# Patient Record
Sex: FEMALE | Race: WHITE | NOT HISPANIC OR LATINO | Employment: PART TIME
[De-identification: names, ages, dates, MRNs, and addresses within clinical notes are randomized per-mention and may not be internally consistent; named-entity substitution may affect disease eponyms.]

---

## 2017-02-07 LAB
HPV INTERPRETATION - HISTORICAL: ABNORMAL
HPV INTERPRETER - HISTORICAL: ABNORMAL

## 2017-02-08 ENCOUNTER — RECORDS - HEALTHEAST (OUTPATIENT)
Dept: ADMINISTRATIVE | Facility: OTHER | Age: 57
End: 2017-02-08

## 2017-02-08 LAB
BKR LAB AP ABNORMAL BLEEDING: NO
BKR LAB AP BIRTH CONTROL/HORMONES: ABNORMAL
BKR LAB AP CERVICAL APPEARANCE: NORMAL
BKR LAB AP GYN ADEQUACY: ABNORMAL
BKR LAB AP GYN INTERPRETATION: ABNORMAL
BKR LAB AP HPV REFLEX: ABNORMAL
BKR LAB AP LMP: ABNORMAL
BKR LAB AP PATIENT STATUS: ABNORMAL
BKR LAB AP PREVIOUS ABNORMAL: ABNORMAL
BKR LAB AP PREVIOUS NORMAL: ABNORMAL
HIGH RISK?: NO
LAB AP GYN INTERP 2: ABNORMAL
PATH REPORT.COMMENTS IMP SPEC: ABNORMAL
RESULT FLAG (HE HISTORICAL CONVERSION): ABNORMAL

## 2017-10-17 ASSESSMENT — MIFFLIN-ST. JEOR: SCORE: 1552.88

## 2017-10-18 ENCOUNTER — SURGERY - HEALTHEAST (OUTPATIENT)
Dept: GASTROENTEROLOGY | Facility: HOSPITAL | Age: 57
End: 2017-10-18

## 2017-10-19 ASSESSMENT — MIFFLIN-ST. JEOR: SCORE: 1577.83

## 2018-01-08 ENCOUNTER — RECORDS - HEALTHEAST (OUTPATIENT)
Dept: ADMINISTRATIVE | Facility: OTHER | Age: 58
End: 2018-01-08

## 2018-01-29 ENCOUNTER — HOSPITAL ENCOUNTER (OUTPATIENT)
Dept: RADIOLOGY | Facility: HOSPITAL | Age: 58
Discharge: HOME | End: 2018-01-29
Attending: COLON & RECTAL SURGERY

## 2018-01-29 DIAGNOSIS — K57.92 DIVERTICULITIS: ICD-10-CM

## 2018-06-18 ENCOUNTER — RECORDS - HEALTHEAST (OUTPATIENT)
Dept: LAB | Facility: CLINIC | Age: 58
End: 2018-06-18

## 2018-06-18 LAB
ALBUMIN SERPL-MCNC: 4.4 G/DL (ref 3.5–5)
ALP SERPL-CCNC: 155 U/L (ref 45–120)
ALT SERPL W P-5'-P-CCNC: 23 U/L (ref 0–45)
ANION GAP SERPL CALCULATED.3IONS-SCNC: 11 MMOL/L (ref 5–18)
AST SERPL W P-5'-P-CCNC: 19 U/L (ref 0–40)
BILIRUB SERPL-MCNC: 0.7 MG/DL (ref 0–1)
BUN SERPL-MCNC: 11 MG/DL (ref 8–22)
CALCIUM SERPL-MCNC: 11.1 MG/DL (ref 8.5–10.5)
CHLORIDE BLD-SCNC: 108 MMOL/L (ref 98–107)
CO2 SERPL-SCNC: 23 MMOL/L (ref 22–31)
CREAT SERPL-MCNC: 1.08 MG/DL (ref 0.6–1.1)
GFR SERPL CREATININE-BSD FRML MDRD: 52 ML/MIN/1.73M2
GLUCOSE BLD-MCNC: 139 MG/DL (ref 70–125)
POTASSIUM BLD-SCNC: 4.1 MMOL/L (ref 3.5–5)
PROT SERPL-MCNC: 8.2 G/DL (ref 6–8)
SODIUM SERPL-SCNC: 142 MMOL/L (ref 136–145)

## 2018-07-20 ENCOUNTER — RECORDS - HEALTHEAST (OUTPATIENT)
Dept: LAB | Facility: CLINIC | Age: 58
End: 2018-07-20

## 2018-07-20 LAB
ALBUMIN SERPL-MCNC: 3.8 G/DL (ref 3.5–5)
ALP SERPL-CCNC: 159 U/L (ref 45–120)
ALT SERPL W P-5'-P-CCNC: 18 U/L (ref 0–45)
AMYLASE SERPL-CCNC: 19 U/L (ref 5–120)
ANION GAP SERPL CALCULATED.3IONS-SCNC: 9 MMOL/L (ref 5–18)
AST SERPL W P-5'-P-CCNC: 15 U/L (ref 0–40)
BILIRUB SERPL-MCNC: 0.4 MG/DL (ref 0–1)
BUN SERPL-MCNC: 9 MG/DL (ref 8–22)
CALCIUM SERPL-MCNC: 10.7 MG/DL (ref 8.5–10.5)
CHLORIDE BLD-SCNC: 109 MMOL/L (ref 98–107)
CO2 SERPL-SCNC: 25 MMOL/L (ref 22–31)
CREAT SERPL-MCNC: 0.83 MG/DL (ref 0.6–1.1)
ERYTHROCYTE [SEDIMENTATION RATE] IN BLOOD BY WESTERGREN METHOD: 35 MM/HR (ref 0–20)
GFR SERPL CREATININE-BSD FRML MDRD: >60 ML/MIN/1.73M2
GLUCOSE BLD-MCNC: 114 MG/DL (ref 70–125)
LIPASE SERPL-CCNC: 10 U/L (ref 0–52)
POTASSIUM BLD-SCNC: 4.6 MMOL/L (ref 3.5–5)
PROT SERPL-MCNC: 6.9 G/DL (ref 6–8)
SODIUM SERPL-SCNC: 143 MMOL/L (ref 136–145)

## 2018-09-17 ASSESSMENT — MIFFLIN-ST. JEOR
SCORE: 1510.7
SCORE: 1512.51

## 2018-09-18 ENCOUNTER — SURGERY - HEALTHEAST (OUTPATIENT)
Dept: GASTROENTEROLOGY | Facility: HOSPITAL | Age: 58
End: 2018-09-18

## 2018-09-18 ASSESSMENT — MIFFLIN-ST. JEOR: SCORE: 1467.6

## 2018-09-19 ENCOUNTER — ANESTHESIA - HEALTHEAST (OUTPATIENT)
Dept: GASTROENTEROLOGY | Facility: HOSPITAL | Age: 58
End: 2018-09-19

## 2018-09-19 ASSESSMENT — MIFFLIN-ST. JEOR: SCORE: 1471.23

## 2018-09-20 ASSESSMENT — MIFFLIN-ST. JEOR: SCORE: 1472.59

## 2018-09-21 ENCOUNTER — AMBULATORY - HEALTHEAST (OUTPATIENT)
Dept: CARE COORDINATION | Facility: CLINIC | Age: 58
End: 2018-09-21

## 2018-09-28 ENCOUNTER — COMMUNICATION - HEALTHEAST (OUTPATIENT)
Dept: PHARMACY | Facility: CLINIC | Age: 58
End: 2018-09-28

## 2018-09-28 DIAGNOSIS — K62.5 RECTAL BLEEDING: ICD-10-CM

## 2018-10-16 ENCOUNTER — RECORDS - HEALTHEAST (OUTPATIENT)
Dept: LAB | Facility: CLINIC | Age: 58
End: 2018-10-16

## 2018-10-16 LAB
ERYTHROCYTE [DISTWIDTH] IN BLOOD BY AUTOMATED COUNT: 17.2 % (ref 11–14.5)
HCT VFR BLD AUTO: 30.3 % (ref 35–47)
HGB BLD-MCNC: 9.2 G/DL (ref 12–16)
MCH RBC QN AUTO: 27.1 PG (ref 27–34)
MCHC RBC AUTO-ENTMCNC: 30.4 G/DL (ref 32–36)
MCV RBC AUTO: 89 FL (ref 80–100)
PLATELET # BLD AUTO: 276 THOU/UL (ref 140–440)
PMV BLD AUTO: 9.7 FL (ref 8.5–12.5)
RBC # BLD AUTO: 3.39 MILL/UL (ref 3.8–5.4)
WBC: 4.7 THOU/UL (ref 4–11)

## 2018-10-26 ENCOUNTER — RECORDS - HEALTHEAST (OUTPATIENT)
Dept: ADMINISTRATIVE | Facility: OTHER | Age: 58
End: 2018-10-26

## 2018-12-16 ENCOUNTER — RECORDS - HEALTHEAST (OUTPATIENT)
Dept: LAB | Facility: CLINIC | Age: 58
End: 2018-12-16

## 2018-12-16 LAB
ALBUMIN SERPL-MCNC: 3.7 G/DL (ref 3.5–5)
ALP SERPL-CCNC: 157 U/L (ref 45–120)
ALT SERPL W P-5'-P-CCNC: 20 U/L (ref 0–45)
ANION GAP SERPL CALCULATED.3IONS-SCNC: 6 MMOL/L (ref 5–18)
AST SERPL W P-5'-P-CCNC: 15 U/L (ref 0–40)
BILIRUB SERPL-MCNC: 0.2 MG/DL (ref 0–1)
BUN SERPL-MCNC: 9 MG/DL (ref 8–22)
CALCIUM SERPL-MCNC: 10.2 MG/DL (ref 8.5–10.5)
CHLORIDE BLD-SCNC: 107 MMOL/L (ref 98–107)
CO2 SERPL-SCNC: 26 MMOL/L (ref 22–31)
CREAT SERPL-MCNC: 0.8 MG/DL (ref 0.6–1.1)
ERYTHROCYTE [DISTWIDTH] IN BLOOD BY AUTOMATED COUNT: 17.2 % (ref 11–14.5)
GFR SERPL CREATININE-BSD FRML MDRD: >60 ML/MIN/1.73M2
GLUCOSE BLD-MCNC: 123 MG/DL (ref 70–125)
HCT VFR BLD AUTO: 31.5 % (ref 35–47)
HGB BLD-MCNC: 9.5 G/DL (ref 12–16)
MCH RBC QN AUTO: 26.5 PG (ref 27–34)
MCHC RBC AUTO-ENTMCNC: 30.2 G/DL (ref 32–36)
MCV RBC AUTO: 88 FL (ref 80–100)
PLATELET # BLD AUTO: 285 THOU/UL (ref 140–440)
PMV BLD AUTO: 9.7 FL (ref 8.5–12.5)
POTASSIUM BLD-SCNC: 4.3 MMOL/L (ref 3.5–5)
PROT SERPL-MCNC: 6.9 G/DL (ref 6–8)
RBC # BLD AUTO: 3.59 MILL/UL (ref 3.8–5.4)
SODIUM SERPL-SCNC: 139 MMOL/L (ref 136–145)
TSH SERPL DL<=0.005 MIU/L-ACNC: 0.9 UIU/ML (ref 0.3–5)
WBC: 3.9 THOU/UL (ref 4–11)

## 2019-01-02 ENCOUNTER — RECORDS - HEALTHEAST (OUTPATIENT)
Dept: ADMINISTRATIVE | Facility: OTHER | Age: 59
End: 2019-01-02

## 2019-01-11 ENCOUNTER — RECORDS - HEALTHEAST (OUTPATIENT)
Dept: LAB | Facility: CLINIC | Age: 59
End: 2019-01-11

## 2019-01-11 LAB
ANION GAP SERPL CALCULATED.3IONS-SCNC: 9 MMOL/L (ref 5–18)
BUN SERPL-MCNC: 10 MG/DL (ref 8–22)
CALCIUM SERPL-MCNC: 10.2 MG/DL (ref 8.5–10.5)
CHLORIDE BLD-SCNC: 104 MMOL/L (ref 98–107)
CO2 SERPL-SCNC: 24 MMOL/L (ref 22–31)
CREAT SERPL-MCNC: 0.78 MG/DL (ref 0.6–1.1)
GFR SERPL CREATININE-BSD FRML MDRD: >60 ML/MIN/1.73M2
GLUCOSE BLD-MCNC: 113 MG/DL (ref 70–125)
POTASSIUM BLD-SCNC: 3.5 MMOL/L (ref 3.5–5)
SODIUM SERPL-SCNC: 137 MMOL/L (ref 136–145)

## 2020-03-04 ENCOUNTER — RECORDS - HEALTHEAST (OUTPATIENT)
Dept: ADMINISTRATIVE | Facility: OTHER | Age: 60
End: 2020-03-04

## 2020-03-05 ENCOUNTER — HOSPITAL ENCOUNTER (OUTPATIENT)
Dept: MAMMOGRAPHY | Facility: CLINIC | Age: 60
Discharge: HOME | End: 2020-03-05
Attending: PHYSICIAN ASSISTANT

## 2020-03-05 DIAGNOSIS — Z12.31 SCREENING MAMMOGRAM, ENCOUNTER FOR: ICD-10-CM

## 2020-04-21 ENCOUNTER — RECORDS - HEALTHEAST (OUTPATIENT)
Dept: LAB | Facility: CLINIC | Age: 60
End: 2020-04-21

## 2020-04-21 LAB
ALBUMIN SERPL-MCNC: 3.7 G/DL (ref 3.5–5)
ALP SERPL-CCNC: 166 U/L (ref 45–120)
ALT SERPL W P-5'-P-CCNC: 31 U/L (ref 0–45)
ANION GAP SERPL CALCULATED.3IONS-SCNC: 8 MMOL/L (ref 5–18)
AST SERPL W P-5'-P-CCNC: 18 U/L (ref 0–40)
BILIRUB SERPL-MCNC: 0.4 MG/DL (ref 0–1)
BUN SERPL-MCNC: 13 MG/DL (ref 8–22)
CALCIUM SERPL-MCNC: 10.6 MG/DL (ref 8.5–10.5)
CHLORIDE BLD-SCNC: 104 MMOL/L (ref 98–107)
CO2 SERPL-SCNC: 27 MMOL/L (ref 22–31)
CREAT SERPL-MCNC: 0.82 MG/DL (ref 0.6–1.1)
GFR SERPL CREATININE-BSD FRML MDRD: >60 ML/MIN/1.73M2
GLUCOSE BLD-MCNC: 101 MG/DL (ref 70–125)
POTASSIUM BLD-SCNC: 4 MMOL/L (ref 3.5–5)
PROT SERPL-MCNC: 7.5 G/DL (ref 6–8)
SODIUM SERPL-SCNC: 139 MMOL/L (ref 136–145)

## 2021-05-07 ENCOUNTER — RECORDS - HEALTHEAST (OUTPATIENT)
Dept: LAB | Facility: CLINIC | Age: 61
End: 2021-05-07

## 2021-05-07 LAB
ALBUMIN SERPL-MCNC: 3.5 G/DL (ref 3.5–5)
ANION GAP SERPL CALCULATED.3IONS-SCNC: 8 MMOL/L (ref 5–18)
BUN SERPL-MCNC: 13 MG/DL (ref 8–22)
CALCIUM SERPL-MCNC: 9.8 MG/DL (ref 8.5–10.5)
CHLORIDE BLD-SCNC: 106 MMOL/L (ref 98–107)
CO2 SERPL-SCNC: 25 MMOL/L (ref 22–31)
CREAT SERPL-MCNC: 0.72 MG/DL (ref 0.6–1.1)
GFR SERPL CREATININE-BSD FRML MDRD: >60 ML/MIN/1.73M2
GLUCOSE BLD-MCNC: 131 MG/DL (ref 70–125)
PHOSPHATE SERPL-MCNC: 2.3 MG/DL (ref 2.5–4.5)
POTASSIUM BLD-SCNC: 4.2 MMOL/L (ref 3.5–5)
SODIUM SERPL-SCNC: 139 MMOL/L (ref 136–145)

## 2021-05-20 ENCOUNTER — RECORDS - HEALTHEAST (OUTPATIENT)
Dept: HEALTH INFORMATION MANAGEMENT | Facility: CLINIC | Age: 61
End: 2021-05-20

## 2021-05-20 ENCOUNTER — TRANSFERRED RECORDS (OUTPATIENT)
Dept: HEALTH INFORMATION MANAGEMENT | Facility: CLINIC | Age: 61
End: 2021-05-20

## 2021-05-31 VITALS — HEIGHT: 66 IN | WEIGHT: 221.8 LBS | BODY MASS INDEX: 35.65 KG/M2

## 2021-06-02 ENCOUNTER — RECORDS - HEALTHEAST (OUTPATIENT)
Dept: ADMINISTRATIVE | Facility: CLINIC | Age: 61
End: 2021-06-02

## 2021-06-02 VITALS — HEIGHT: 66 IN | WEIGHT: 198.6 LBS | BODY MASS INDEX: 31.92 KG/M2

## 2021-06-14 ENCOUNTER — RECORDS - HEALTHEAST (OUTPATIENT)
Dept: ADMINISTRATIVE | Facility: OTHER | Age: 61
End: 2021-06-14

## 2021-06-15 ENCOUNTER — RECORDS - HEALTHEAST (OUTPATIENT)
Dept: ADMINISTRATIVE | Facility: OTHER | Age: 61
End: 2021-06-15

## 2021-06-16 PROBLEM — K58.9 IBS (IRRITABLE BOWEL SYNDROME): Status: ACTIVE | Noted: 2017-10-15

## 2021-06-16 PROBLEM — K92.2 GASTROINTESTINAL BLEEDING: Status: ACTIVE | Noted: 2018-09-17

## 2021-06-16 PROBLEM — R10.13 ABDOMINAL PAIN, EPIGASTRIC: Status: ACTIVE | Noted: 2018-09-18

## 2021-06-16 PROBLEM — Z86.79 HISTORY OF ATRIAL FIBRILLATION: Status: ACTIVE | Noted: 2017-10-11

## 2021-06-16 PROBLEM — K62.5 RECTAL BLEEDING: Status: ACTIVE | Noted: 2018-09-17

## 2021-06-16 PROBLEM — E83.42 HYPOMAGNESEMIA: Status: ACTIVE | Noted: 2017-10-18

## 2021-06-16 PROBLEM — R11.10 VOMITING: Status: ACTIVE | Noted: 2018-12-26

## 2021-06-16 PROBLEM — I10 ACCELERATED HYPERTENSION: Status: ACTIVE | Noted: 2018-10-22

## 2021-06-16 PROBLEM — R11.2 NAUSEA AND VOMITING: Status: ACTIVE | Noted: 2018-12-26

## 2021-06-16 PROBLEM — K31.84 GASTROPARESIS: Status: ACTIVE | Noted: 2017-10-19

## 2021-06-20 NOTE — PROGRESS NOTES
Clinic Care Coordination Referral received from [facility].  Patient does not have a [facility] PCP.  Leslira  notified of [facility] referral made.

## 2021-06-23 ENCOUNTER — TRANSFERRED RECORDS (OUTPATIENT)
Dept: HEALTH INFORMATION MANAGEMENT | Facility: CLINIC | Age: 61
End: 2021-06-23

## 2021-06-23 ENCOUNTER — RECORDS - HEALTHEAST (OUTPATIENT)
Dept: LAB | Facility: CLINIC | Age: 61
End: 2021-06-23

## 2021-06-23 LAB
ANION GAP SERPL CALCULATED.3IONS-SCNC: 10 MMOL/L (ref 5–18)
BUN SERPL-MCNC: 10 MG/DL (ref 8–22)
CALCIUM SERPL-MCNC: 9.9 MG/DL (ref 8.5–10.5)
CHLORIDE BLD-SCNC: 104 MMOL/L (ref 98–107)
CO2 SERPL-SCNC: 23 MMOL/L (ref 22–31)
CREAT SERPL-MCNC: 0.76 MG/DL (ref 0.6–1.1)
GFR SERPL CREATININE-BSD FRML MDRD: >60 ML/MIN/1.73M2
GLUCOSE BLD-MCNC: 129 MG/DL (ref 70–125)
POTASSIUM BLD-SCNC: 4.4 MMOL/L (ref 3.5–5)
SODIUM SERPL-SCNC: 137 MMOL/L (ref 136–145)

## 2021-06-24 LAB
SARS-COV-2 PCR COMMENT: NORMAL
SARS-COV-2 RNA SPEC QL NAA+PROBE: NEGATIVE
SARS-COV-2 VIRUS SPECIMEN SOURCE: NORMAL

## 2021-06-27 DIAGNOSIS — Z11.59 ENCOUNTER FOR SCREENING FOR OTHER VIRAL DISEASES: Primary | ICD-10-CM

## 2021-07-21 ASSESSMENT — MIFFLIN-ST. JEOR: SCORE: 1705.74

## 2021-07-21 NOTE — PROVIDER NOTIFICATION
Discharge plan according to [facility]:       07/21/21 1227   Discharge Planning   Patient/Family Anticipates Transition to home with family   Living Arrangements   People in home child(ashley), adult   Type of Residence Private Residence   Is your private residence a single family home or apartment? Single family home   Bathroom Shower/Tub Tub/Shower unit   Equipment Currently Used at Home none   Support System   Support Systems Children   Do you have someone available to stay with you one or two nights once you are home? Yes

## 2021-07-22 ENCOUNTER — LAB REQUISITION (OUTPATIENT)
Dept: LAB | Facility: CLINIC | Age: 61
End: 2021-07-22
Payer: COMMERCIAL

## 2021-07-22 ENCOUNTER — LAB REQUISITION (OUTPATIENT)
Dept: LAB | Facility: CLINIC | Age: 61
End: 2021-07-22

## 2021-07-22 DIAGNOSIS — Z01.812 ENCOUNTER FOR PREPROCEDURAL LABORATORY EXAMINATION: ICD-10-CM

## 2021-07-22 LAB
ALBUMIN SERPL-MCNC: 3.6 G/DL (ref 3.5–5)
ANION GAP SERPL CALCULATED.3IONS-SCNC: 7 MMOL/L (ref 5–18)
BUN SERPL-MCNC: 15 MG/DL (ref 8–22)
CALCIUM SERPL-MCNC: 10.4 MG/DL (ref 8.5–10.5)
CHLORIDE BLD-SCNC: 106 MMOL/L (ref 98–107)
CO2 SERPL-SCNC: 26 MMOL/L (ref 22–31)
CREAT SERPL-MCNC: 0.8 MG/DL (ref 0.6–1.1)
GFR SERPL CREATININE-BSD FRML MDRD: 79 ML/MIN/1.73M2
GLUCOSE BLD-MCNC: 106 MG/DL (ref 70–125)
PHOSPHATE SERPL-MCNC: 2.8 MG/DL (ref 2.5–4.5)
POTASSIUM BLD-SCNC: 4.1 MMOL/L (ref 3.5–5)
SODIUM SERPL-SCNC: 139 MMOL/L (ref 136–145)

## 2021-07-22 PROCEDURE — U0005 INFEC AGEN DETEC AMPLI PROBE: HCPCS | Mod: ORL | Performed by: FAMILY MEDICINE

## 2021-07-22 PROCEDURE — 80069 RENAL FUNCTION PANEL: CPT | Performed by: FAMILY MEDICINE

## 2021-07-23 LAB — SARS-COV-2 RNA RESP QL NAA+PROBE: NEGATIVE

## 2021-07-26 ENCOUNTER — ANESTHESIA (OUTPATIENT)
Dept: SURGERY | Facility: CLINIC | Age: 61
End: 2021-07-26
Payer: COMMERCIAL

## 2021-07-26 ENCOUNTER — HOSPITAL ENCOUNTER (OUTPATIENT)
Facility: CLINIC | Age: 61
Discharge: HOME | End: 2021-07-27
Attending: ORTHOPAEDIC SURGERY | Admitting: ORTHOPAEDIC SURGERY
Payer: COMMERCIAL

## 2021-07-26 ENCOUNTER — APPOINTMENT (OUTPATIENT)
Dept: RADIOLOGY | Facility: CLINIC | Age: 61
End: 2021-07-26
Attending: PHYSICIAN ASSISTANT
Payer: COMMERCIAL

## 2021-07-26 ENCOUNTER — ANESTHESIA EVENT (OUTPATIENT)
Dept: SURGERY | Facility: CLINIC | Age: 61
End: 2021-07-26
Payer: COMMERCIAL

## 2021-07-26 ENCOUNTER — ANCILLARY PROCEDURE (OUTPATIENT)
Dept: ULTRASOUND IMAGING | Facility: CLINIC | Age: 61
End: 2021-07-26
Attending: ANESTHESIOLOGY
Payer: COMMERCIAL

## 2021-07-26 DIAGNOSIS — Z96.651 STATUS POST TOTAL RIGHT KNEE REPLACEMENT: ICD-10-CM

## 2021-07-26 DIAGNOSIS — M17.11 PRIMARY OSTEOARTHRITIS OF RIGHT KNEE: Primary | ICD-10-CM

## 2021-07-26 LAB
ERYTHROCYTE [DISTWIDTH] IN BLOOD BY AUTOMATED COUNT: 12.1 % (ref 10–15)
HCT VFR BLD AUTO: 39.5 % (ref 35–47)
HGB BLD-MCNC: 13.6 G/DL (ref 11.7–15.7)
MCH RBC QN AUTO: 31.9 PG (ref 26.5–33)
MCHC RBC AUTO-ENTMCNC: 34.4 G/DL (ref 31.5–36.5)
MCV RBC AUTO: 93 FL (ref 78–100)
PLATELET # BLD AUTO: 228 10E3/UL (ref 150–450)
RBC # BLD AUTO: 4.26 10E6/UL (ref 3.8–5.2)
WBC # BLD AUTO: 5 10E3/UL (ref 4–11)

## 2021-07-26 PROCEDURE — 250N000011 HC RX IP 250 OP 636: Performed by: ANESTHESIOLOGY

## 2021-07-26 PROCEDURE — 258N000003 HC RX IP 258 OP 636: Performed by: ANESTHESIOLOGY

## 2021-07-26 PROCEDURE — 250N000013 HC RX MED GY IP 250 OP 250 PS 637: Performed by: FAMILY MEDICINE

## 2021-07-26 PROCEDURE — 36415 COLL VENOUS BLD VENIPUNCTURE: CPT | Performed by: PHYSICIAN ASSISTANT

## 2021-07-26 PROCEDURE — 250N000011 HC RX IP 250 OP 636: Performed by: PHYSICIAN ASSISTANT

## 2021-07-26 PROCEDURE — 999N000141 HC STATISTIC PRE-PROCEDURE NURSING ASSESSMENT: Performed by: ORTHOPAEDIC SURGERY

## 2021-07-26 PROCEDURE — 250N000009 HC RX 250: Performed by: ANESTHESIOLOGY

## 2021-07-26 PROCEDURE — 999N000065 XR KNEE PORT RIGHT 1/2 VIEWS: Mod: RT

## 2021-07-26 PROCEDURE — C1776 JOINT DEVICE (IMPLANTABLE): HCPCS | Performed by: ORTHOPAEDIC SURGERY

## 2021-07-26 PROCEDURE — 250N000009 HC RX 250: Performed by: PHYSICIAN ASSISTANT

## 2021-07-26 PROCEDURE — 250N000011 HC RX IP 250 OP 636: Performed by: NURSE ANESTHETIST, CERTIFIED REGISTERED

## 2021-07-26 PROCEDURE — 278N000051 HC OR IMPLANT GENERAL: Performed by: ORTHOPAEDIC SURGERY

## 2021-07-26 PROCEDURE — 258N000003 HC RX IP 258 OP 636: Performed by: NURSE ANESTHETIST, CERTIFIED REGISTERED

## 2021-07-26 PROCEDURE — 250N000013 HC RX MED GY IP 250 OP 250 PS 637: Performed by: PHYSICIAN ASSISTANT

## 2021-07-26 PROCEDURE — 250N000009 HC RX 250: Performed by: NURSE ANESTHETIST, CERTIFIED REGISTERED

## 2021-07-26 PROCEDURE — 250N000013 HC RX MED GY IP 250 OP 250 PS 637: Performed by: ANESTHESIOLOGY

## 2021-07-26 PROCEDURE — 370N000017 HC ANESTHESIA TECHNICAL FEE, PER MIN: Performed by: ORTHOPAEDIC SURGERY

## 2021-07-26 PROCEDURE — 710N000009 HC RECOVERY PHASE 1, LEVEL 1, PER MIN: Performed by: ORTHOPAEDIC SURGERY

## 2021-07-26 PROCEDURE — 258N000003 HC RX IP 258 OP 636: Performed by: PHYSICIAN ASSISTANT

## 2021-07-26 PROCEDURE — 99204 OFFICE O/P NEW MOD 45 MIN: CPT | Performed by: FAMILY MEDICINE

## 2021-07-26 PROCEDURE — 360N000077 HC SURGERY LEVEL 4, PER MIN: Performed by: ORTHOPAEDIC SURGERY

## 2021-07-26 PROCEDURE — 85027 COMPLETE CBC AUTOMATED: CPT | Performed by: PHYSICIAN ASSISTANT

## 2021-07-26 PROCEDURE — 272N000001 HC OR GENERAL SUPPLY STERILE: Performed by: ORTHOPAEDIC SURGERY

## 2021-07-26 PROCEDURE — 258N000001 HC RX 258: Performed by: ORTHOPAEDIC SURGERY

## 2021-07-26 DEVICE — TIBIAL BEARING INSERT - CS
Type: IMPLANTABLE DEVICE | Site: KNEE | Status: FUNCTIONAL
Brand: TRIATHLON

## 2021-07-26 DEVICE — PRIMARY TIBIAL BASEPLATE
Type: IMPLANTABLE DEVICE | Site: KNEE | Status: FUNCTIONAL
Brand: TRIATHLON

## 2021-07-26 DEVICE — BONE CEMENT SIMPLEX FULL DOSE 6191-1-001: Type: IMPLANTABLE DEVICE | Site: KNEE | Status: FUNCTIONAL

## 2021-07-26 DEVICE — CRUCIATE RETAINING FEMORAL
Type: IMPLANTABLE DEVICE | Site: KNEE | Status: FUNCTIONAL
Brand: TRIATHLON

## 2021-07-26 RX ORDER — LIDOCAINE 40 MG/G
CREAM TOPICAL
Status: DISCONTINUED | OUTPATIENT
Start: 2021-07-26 | End: 2021-07-27 | Stop reason: HOSPADM

## 2021-07-26 RX ORDER — ONDANSETRON 4 MG/1
4 TABLET, ORALLY DISINTEGRATING ORAL EVERY 6 HOURS PRN
Status: DISCONTINUED | OUTPATIENT
Start: 2021-07-26 | End: 2021-07-27 | Stop reason: HOSPADM

## 2021-07-26 RX ORDER — CALCIUM CARBONATE 500 MG/1
500 TABLET, CHEWABLE ORAL 4 TIMES DAILY PRN
Status: DISCONTINUED | OUTPATIENT
Start: 2021-07-26 | End: 2021-07-27 | Stop reason: HOSPADM

## 2021-07-26 RX ORDER — DIPHENHYDRAMINE HCL 25 MG
25 TABLET ORAL EVERY 6 HOURS PRN
Status: DISCONTINUED | OUTPATIENT
Start: 2021-07-26 | End: 2021-07-26 | Stop reason: HOSPADM

## 2021-07-26 RX ORDER — ACETAMINOPHEN 325 MG/1
975 TABLET ORAL ONCE
Status: COMPLETED | OUTPATIENT
Start: 2021-07-26 | End: 2021-07-26

## 2021-07-26 RX ORDER — SODIUM CHLORIDE, SODIUM LACTATE, POTASSIUM CHLORIDE, CALCIUM CHLORIDE 600; 310; 30; 20 MG/100ML; MG/100ML; MG/100ML; MG/100ML
INJECTION, SOLUTION INTRAVENOUS CONTINUOUS
Status: DISCONTINUED | OUTPATIENT
Start: 2021-07-26 | End: 2021-07-27 | Stop reason: HOSPADM

## 2021-07-26 RX ORDER — ACETAMINOPHEN 325 MG/1
975 TABLET ORAL EVERY 8 HOURS
Status: DISCONTINUED | OUTPATIENT
Start: 2021-07-26 | End: 2021-07-27 | Stop reason: HOSPADM

## 2021-07-26 RX ORDER — IBUPROFEN 800 MG/1
800 TABLET, FILM COATED ORAL EVERY 8 HOURS PRN
Status: ON HOLD | COMMUNITY
End: 2021-07-27

## 2021-07-26 RX ORDER — HALOPERIDOL 5 MG/ML
1 INJECTION INTRAMUSCULAR
Status: DISCONTINUED | OUTPATIENT
Start: 2021-07-26 | End: 2021-07-26 | Stop reason: HOSPADM

## 2021-07-26 RX ORDER — TRANEXAMIC ACID 650 MG/1
1950 TABLET ORAL ONCE
Status: COMPLETED | OUTPATIENT
Start: 2021-07-26 | End: 2021-07-26

## 2021-07-26 RX ORDER — CEFADROXIL 500 MG/1
500 CAPSULE ORAL 2 TIMES DAILY
Qty: 14 CAPSULE | Refills: 0 | Status: SHIPPED | OUTPATIENT
Start: 2021-07-26 | End: 2021-07-26

## 2021-07-26 RX ORDER — ASPIRIN 81 MG/1
81 TABLET, CHEWABLE ORAL 2 TIMES DAILY
Qty: 60 TABLET | Refills: 0 | Status: SHIPPED | OUTPATIENT
Start: 2021-07-26

## 2021-07-26 RX ORDER — OXYCODONE HYDROCHLORIDE 5 MG/1
5-10 TABLET ORAL EVERY 6 HOURS PRN
Qty: 50 TABLET | Refills: 0 | Status: SHIPPED | OUTPATIENT
Start: 2021-07-26 | End: 2021-07-26

## 2021-07-26 RX ORDER — OXYCODONE HYDROCHLORIDE 5 MG/1
5 TABLET ORAL EVERY 4 HOURS PRN
Status: DISCONTINUED | OUTPATIENT
Start: 2021-07-26 | End: 2021-07-27 | Stop reason: HOSPADM

## 2021-07-26 RX ORDER — SODIUM CHLORIDE, SODIUM LACTATE, POTASSIUM CHLORIDE, CALCIUM CHLORIDE 600; 310; 30; 20 MG/100ML; MG/100ML; MG/100ML; MG/100ML
INJECTION, SOLUTION INTRAVENOUS CONTINUOUS
Status: DISCONTINUED | OUTPATIENT
Start: 2021-07-26 | End: 2021-07-26 | Stop reason: HOSPADM

## 2021-07-26 RX ORDER — BUPIVACAINE HYDROCHLORIDE 7.5 MG/ML
INJECTION, SOLUTION INTRASPINAL
Status: COMPLETED | OUTPATIENT
Start: 2021-07-26 | End: 2021-07-26

## 2021-07-26 RX ORDER — HYDROXYZINE PAMOATE 25 MG/1
25 CAPSULE ORAL 4 TIMES DAILY
Qty: 60 CAPSULE | Refills: 0 | Status: SHIPPED | OUTPATIENT
Start: 2021-07-26

## 2021-07-26 RX ORDER — HYDROXYZINE PAMOATE 25 MG/1
25 CAPSULE ORAL 4 TIMES DAILY
Qty: 60 CAPSULE | Refills: 0 | Status: SHIPPED | OUTPATIENT
Start: 2021-07-26 | End: 2021-07-26

## 2021-07-26 RX ORDER — FERROUS SULFATE 325(65) MG
325 TABLET ORAL 2 TIMES DAILY WITH MEALS
Status: DISCONTINUED | OUTPATIENT
Start: 2021-07-26 | End: 2021-07-27 | Stop reason: HOSPADM

## 2021-07-26 RX ORDER — SENNA AND DOCUSATE SODIUM 50; 8.6 MG/1; MG/1
2 TABLET, FILM COATED ORAL 2 TIMES DAILY
Qty: 50 TABLET | Refills: 0 | Status: SHIPPED | OUTPATIENT
Start: 2021-07-26 | End: 2021-07-26

## 2021-07-26 RX ORDER — CEFAZOLIN SODIUM 2 G/100ML
2 INJECTION, SOLUTION INTRAVENOUS
Status: COMPLETED | OUTPATIENT
Start: 2021-07-26 | End: 2021-07-26

## 2021-07-26 RX ORDER — GLYCOPYRROLATE 0.2 MG/ML
INJECTION, SOLUTION INTRAMUSCULAR; INTRAVENOUS PRN
Status: DISCONTINUED | OUTPATIENT
Start: 2021-07-26 | End: 2021-07-26

## 2021-07-26 RX ORDER — OXYCODONE HYDROCHLORIDE 5 MG/1
5-10 TABLET ORAL EVERY 6 HOURS PRN
Qty: 50 TABLET | Refills: 0 | Status: SHIPPED | OUTPATIENT
Start: 2021-07-26

## 2021-07-26 RX ORDER — PROCHLORPERAZINE MALEATE 10 MG
10 TABLET ORAL EVERY 6 HOURS PRN
Status: DISCONTINUED | OUTPATIENT
Start: 2021-07-26 | End: 2021-07-27 | Stop reason: HOSPADM

## 2021-07-26 RX ORDER — HYDROMORPHONE HCL IN WATER/PF 6 MG/30 ML
0.4 PATIENT CONTROLLED ANALGESIA SYRINGE INTRAVENOUS
Status: DISCONTINUED | OUTPATIENT
Start: 2021-07-26 | End: 2021-07-27 | Stop reason: HOSPADM

## 2021-07-26 RX ORDER — PROPOFOL 10 MG/ML
INJECTION, EMULSION INTRAVENOUS PRN
Status: DISCONTINUED | OUTPATIENT
Start: 2021-07-26 | End: 2021-07-26

## 2021-07-26 RX ORDER — CEFADROXIL 500 MG/1
500 CAPSULE ORAL 2 TIMES DAILY
Qty: 14 CAPSULE | Refills: 0 | Status: SHIPPED | OUTPATIENT
Start: 2021-07-26

## 2021-07-26 RX ORDER — AMOXICILLIN 250 MG
1 CAPSULE ORAL 2 TIMES DAILY
Status: DISCONTINUED | OUTPATIENT
Start: 2021-07-26 | End: 2021-07-27 | Stop reason: HOSPADM

## 2021-07-26 RX ORDER — ONDANSETRON 4 MG/1
4 TABLET, ORALLY DISINTEGRATING ORAL EVERY 30 MIN PRN
Status: DISCONTINUED | OUTPATIENT
Start: 2021-07-26 | End: 2021-07-26 | Stop reason: HOSPADM

## 2021-07-26 RX ORDER — CELECOXIB 200 MG/1
400 CAPSULE ORAL ONCE
Status: COMPLETED | OUTPATIENT
Start: 2021-07-26 | End: 2021-07-26

## 2021-07-26 RX ORDER — ONDANSETRON 2 MG/ML
4 INJECTION INTRAMUSCULAR; INTRAVENOUS EVERY 6 HOURS PRN
Status: DISCONTINUED | OUTPATIENT
Start: 2021-07-26 | End: 2021-07-27 | Stop reason: HOSPADM

## 2021-07-26 RX ORDER — FENTANYL CITRATE 50 UG/ML
50 INJECTION, SOLUTION INTRAMUSCULAR; INTRAVENOUS
Status: DISCONTINUED | OUTPATIENT
Start: 2021-07-26 | End: 2021-07-26

## 2021-07-26 RX ORDER — ONDANSETRON 2 MG/ML
4 INJECTION INTRAMUSCULAR; INTRAVENOUS EVERY 30 MIN PRN
Status: DISCONTINUED | OUTPATIENT
Start: 2021-07-26 | End: 2021-07-26 | Stop reason: HOSPADM

## 2021-07-26 RX ORDER — ACETAMINOPHEN 325 MG/1
650 TABLET ORAL EVERY 4 HOURS PRN
Status: DISCONTINUED | OUTPATIENT
Start: 2021-07-29 | End: 2021-07-27 | Stop reason: HOSPADM

## 2021-07-26 RX ORDER — DIPHENHYDRAMINE HYDROCHLORIDE 50 MG/ML
25 INJECTION INTRAMUSCULAR; INTRAVENOUS EVERY 6 HOURS PRN
Status: DISCONTINUED | OUTPATIENT
Start: 2021-07-26 | End: 2021-07-26 | Stop reason: HOSPADM

## 2021-07-26 RX ORDER — ASPIRIN 81 MG/1
81 TABLET, CHEWABLE ORAL 2 TIMES DAILY
Qty: 60 TABLET | Refills: 0 | Status: SHIPPED | OUTPATIENT
Start: 2021-07-26 | End: 2021-07-26

## 2021-07-26 RX ORDER — FENTANYL CITRATE 50 UG/ML
50 INJECTION, SOLUTION INTRAMUSCULAR; INTRAVENOUS EVERY 5 MIN PRN
Status: ACTIVE | OUTPATIENT
Start: 2021-07-26 | End: 2021-07-26

## 2021-07-26 RX ORDER — HYDROMORPHONE HCL IN WATER/PF 6 MG/30 ML
0.2 PATIENT CONTROLLED ANALGESIA SYRINGE INTRAVENOUS EVERY 5 MIN PRN
Status: ACTIVE | OUTPATIENT
Start: 2021-07-26 | End: 2021-07-26

## 2021-07-26 RX ORDER — ONDANSETRON 2 MG/ML
INJECTION INTRAMUSCULAR; INTRAVENOUS PRN
Status: DISCONTINUED | OUTPATIENT
Start: 2021-07-26 | End: 2021-07-26

## 2021-07-26 RX ORDER — POLYETHYLENE GLYCOL 3350 17 G/17G
17 POWDER, FOR SOLUTION ORAL DAILY
Status: DISCONTINUED | OUTPATIENT
Start: 2021-07-27 | End: 2021-07-27 | Stop reason: HOSPADM

## 2021-07-26 RX ORDER — EPHEDRINE SULFATE 50 MG/ML
INJECTION, SOLUTION INTRAMUSCULAR; INTRAVENOUS; SUBCUTANEOUS PRN
Status: DISCONTINUED | OUTPATIENT
Start: 2021-07-26 | End: 2021-07-26

## 2021-07-26 RX ORDER — ATENOLOL 25 MG/1
25 TABLET ORAL 2 TIMES DAILY
Status: DISCONTINUED | OUTPATIENT
Start: 2021-07-26 | End: 2021-07-27 | Stop reason: HOSPADM

## 2021-07-26 RX ORDER — SENNA AND DOCUSATE SODIUM 50; 8.6 MG/1; MG/1
2 TABLET, FILM COATED ORAL 2 TIMES DAILY
Qty: 50 TABLET | Refills: 0 | Status: SHIPPED | OUTPATIENT
Start: 2021-07-26

## 2021-07-26 RX ORDER — MAGNESIUM SULFATE 4 G/50ML
4 INJECTION INTRAVENOUS ONCE
Status: COMPLETED | OUTPATIENT
Start: 2021-07-26 | End: 2021-07-26

## 2021-07-26 RX ORDER — ALBUTEROL SULFATE 0.83 MG/ML
2.5 SOLUTION RESPIRATORY (INHALATION) EVERY 4 HOURS PRN
Status: DISCONTINUED | OUTPATIENT
Start: 2021-07-26 | End: 2021-07-26 | Stop reason: HOSPADM

## 2021-07-26 RX ORDER — PROPOFOL 10 MG/ML
INJECTION, EMULSION INTRAVENOUS CONTINUOUS PRN
Status: DISCONTINUED | OUTPATIENT
Start: 2021-07-26 | End: 2021-07-26

## 2021-07-26 RX ORDER — BISACODYL 10 MG
10 SUPPOSITORY, RECTAL RECTAL DAILY PRN
Status: DISCONTINUED | OUTPATIENT
Start: 2021-07-26 | End: 2021-07-27 | Stop reason: HOSPADM

## 2021-07-26 RX ORDER — HYDROXYZINE HYDROCHLORIDE 25 MG/1
25 TABLET, FILM COATED ORAL EVERY 6 HOURS PRN
Status: DISCONTINUED | OUTPATIENT
Start: 2021-07-26 | End: 2021-07-27 | Stop reason: HOSPADM

## 2021-07-26 RX ORDER — OXYCODONE HCL 10 MG/1
10 TABLET, FILM COATED, EXTENDED RELEASE ORAL ONCE
Status: COMPLETED | OUTPATIENT
Start: 2021-07-26 | End: 2021-07-26

## 2021-07-26 RX ORDER — KETOROLAC TROMETHAMINE 30 MG/ML
15 INJECTION, SOLUTION INTRAMUSCULAR; INTRAVENOUS EVERY 6 HOURS
Status: DISCONTINUED | OUTPATIENT
Start: 2021-07-26 | End: 2021-07-27 | Stop reason: HOSPADM

## 2021-07-26 RX ORDER — HYDROMORPHONE HCL IN WATER/PF 6 MG/30 ML
0.2 PATIENT CONTROLLED ANALGESIA SYRINGE INTRAVENOUS
Status: DISCONTINUED | OUTPATIENT
Start: 2021-07-26 | End: 2021-07-27 | Stop reason: HOSPADM

## 2021-07-26 RX ORDER — CEFAZOLIN SODIUM 2 G/100ML
2 INJECTION, SOLUTION INTRAVENOUS EVERY 8 HOURS
Status: COMPLETED | OUTPATIENT
Start: 2021-07-26 | End: 2021-07-27

## 2021-07-26 RX ORDER — OXYCODONE HYDROCHLORIDE 5 MG/1
10 TABLET ORAL EVERY 4 HOURS PRN
Status: DISCONTINUED | OUTPATIENT
Start: 2021-07-26 | End: 2021-07-27 | Stop reason: HOSPADM

## 2021-07-26 RX ORDER — ASPIRIN 81 MG/1
81 TABLET ORAL 2 TIMES DAILY
Status: DISCONTINUED | OUTPATIENT
Start: 2021-07-26 | End: 2021-07-27 | Stop reason: HOSPADM

## 2021-07-26 RX ORDER — CEFAZOLIN SODIUM 2 G/100ML
2 INJECTION, SOLUTION INTRAVENOUS SEE ADMIN INSTRUCTIONS
Status: DISCONTINUED | OUTPATIENT
Start: 2021-07-26 | End: 2021-07-26 | Stop reason: HOSPADM

## 2021-07-26 RX ORDER — PANTOPRAZOLE SODIUM 20 MG/1
40 TABLET, DELAYED RELEASE ORAL
Status: DISCONTINUED | OUTPATIENT
Start: 2021-07-26 | End: 2021-07-27 | Stop reason: HOSPADM

## 2021-07-26 RX ORDER — DIAZEPAM 10 MG/2ML
2.5 INJECTION, SOLUTION INTRAMUSCULAR; INTRAVENOUS
Status: DISCONTINUED | OUTPATIENT
Start: 2021-07-26 | End: 2021-07-26 | Stop reason: HOSPADM

## 2021-07-26 RX ADMIN — MAGNESIUM SULFATE HEPTAHYDRATE 4 G: 80 INJECTION, SOLUTION INTRAVENOUS at 10:14

## 2021-07-26 RX ADMIN — KETOROLAC TROMETHAMINE 15 MG: 30 INJECTION, SOLUTION INTRAMUSCULAR; INTRAVENOUS at 18:10

## 2021-07-26 RX ADMIN — PHENYLEPHRINE HYDROCHLORIDE 200 MCG: 10 INJECTION INTRAVENOUS at 11:30

## 2021-07-26 RX ADMIN — CEFAZOLIN SODIUM 2 G: 2 INJECTION, SOLUTION INTRAVENOUS at 18:06

## 2021-07-26 RX ADMIN — PHENYLEPHRINE HYDROCHLORIDE 100 MCG: 10 INJECTION INTRAVENOUS at 11:26

## 2021-07-26 RX ADMIN — SODIUM CHLORIDE, POTASSIUM CHLORIDE, SODIUM LACTATE AND CALCIUM CHLORIDE: 600; 310; 30; 20 INJECTION, SOLUTION INTRAVENOUS at 10:02

## 2021-07-26 RX ADMIN — Medication 10 MG: at 11:18

## 2021-07-26 RX ADMIN — DOCUSATE SODIUM 50MG AND SENNOSIDES 8.6MG 1 TABLET: 8.6; 5 TABLET, FILM COATED ORAL at 19:07

## 2021-07-26 RX ADMIN — OXYCODONE HYDROCHLORIDE 5 MG: 5 TABLET ORAL at 16:14

## 2021-07-26 RX ADMIN — Medication 5 MG: at 11:23

## 2021-07-26 RX ADMIN — FENTANYL CITRATE 50 MCG: 50 INJECTION, SOLUTION INTRAMUSCULAR; INTRAVENOUS at 10:50

## 2021-07-26 RX ADMIN — ACETAMINOPHEN 975 MG: 325 TABLET ORAL at 10:01

## 2021-07-26 RX ADMIN — PHENYLEPHRINE HYDROCHLORIDE 200 MCG: 10 INJECTION INTRAVENOUS at 11:35

## 2021-07-26 RX ADMIN — GLYCOPYRROLATE 0.2 MG: 0.2 INJECTION, SOLUTION INTRAMUSCULAR; INTRAVENOUS at 11:23

## 2021-07-26 RX ADMIN — ASPIRIN 81 MG: 81 TABLET ORAL at 19:07

## 2021-07-26 RX ADMIN — OXYCODONE HYDROCHLORIDE 5 MG: 5 TABLET ORAL at 20:34

## 2021-07-26 RX ADMIN — PANTOPRAZOLE SODIUM 40 MG: 20 TABLET, DELAYED RELEASE ORAL at 19:07

## 2021-07-26 RX ADMIN — SODIUM CHLORIDE, POTASSIUM CHLORIDE, SODIUM LACTATE AND CALCIUM CHLORIDE: 600; 310; 30; 20 INJECTION, SOLUTION INTRAVENOUS at 10:03

## 2021-07-26 RX ADMIN — OXYCODONE HYDROCHLORIDE 10 MG: 10 TABLET, FILM COATED, EXTENDED RELEASE ORAL at 10:01

## 2021-07-26 RX ADMIN — TRANEXAMIC ACID 1950 MG: 650 TABLET ORAL at 10:01

## 2021-07-26 RX ADMIN — FERROUS SULFATE TAB 325 MG (65 MG ELEMENTAL FE) 325 MG: 325 (65 FE) TAB at 19:07

## 2021-07-26 RX ADMIN — SODIUM CHLORIDE, POTASSIUM CHLORIDE, SODIUM LACTATE AND CALCIUM CHLORIDE: 600; 310; 30; 20 INJECTION, SOLUTION INTRAVENOUS at 11:50

## 2021-07-26 RX ADMIN — PROPOFOL 75 MCG/KG/MIN: 10 INJECTION, EMULSION INTRAVENOUS at 11:05

## 2021-07-26 RX ADMIN — BUPIVACAINE HYDROCHLORIDE IN DEXTROSE 2 ML: 7.5 INJECTION, SOLUTION SUBARACHNOID at 11:04

## 2021-07-26 RX ADMIN — BUPIVACAINE HYDROCHLORIDE AND EPINEPHRINE BITARTRATE 15 ML: 5; .005 INJECTION, SOLUTION EPIDURAL; INTRACAUDAL; PERINEURAL at 10:55

## 2021-07-26 RX ADMIN — PHENYLEPHRINE HYDROCHLORIDE 0.4 MCG/KG/MIN: 10 INJECTION INTRAVENOUS at 11:27

## 2021-07-26 RX ADMIN — PHENYLEPHRINE HYDROCHLORIDE 200 MCG: 10 INJECTION INTRAVENOUS at 11:33

## 2021-07-26 RX ADMIN — CEFAZOLIN SODIUM 2 G: 2 INJECTION, SOLUTION INTRAVENOUS at 10:56

## 2021-07-26 RX ADMIN — ACETAMINOPHEN 975 MG: 325 TABLET ORAL at 16:10

## 2021-07-26 RX ADMIN — HYDROMORPHONE HYDROCHLORIDE 0.2 MG: 0.2 INJECTION, SOLUTION INTRAMUSCULAR; INTRAVENOUS; SUBCUTANEOUS at 22:36

## 2021-07-26 RX ADMIN — Medication 10 MG: at 11:21

## 2021-07-26 RX ADMIN — CELECOXIB 400 MG: 200 CAPSULE ORAL at 10:01

## 2021-07-26 RX ADMIN — PROPOFOL 20 MG: 10 INJECTION, EMULSION INTRAVENOUS at 11:05

## 2021-07-26 RX ADMIN — ONDANSETRON 4 MG: 2 INJECTION INTRAMUSCULAR; INTRAVENOUS at 12:39

## 2021-07-26 RX ADMIN — SODIUM CHLORIDE, POTASSIUM CHLORIDE, SODIUM LACTATE AND CALCIUM CHLORIDE: 600; 310; 30; 20 INJECTION, SOLUTION INTRAVENOUS at 18:04

## 2021-07-26 RX ADMIN — ATENOLOL 25 MG: 25 TABLET ORAL at 20:34

## 2021-07-26 RX ADMIN — AMITRIPTYLINE HYDROCHLORIDE 75 MG: 50 TABLET, FILM COATED ORAL at 22:33

## 2021-07-26 RX ADMIN — MIDAZOLAM HYDROCHLORIDE 2 MG: 1 INJECTION, SOLUTION INTRAMUSCULAR; INTRAVENOUS at 10:49

## 2021-07-26 ASSESSMENT — MIFFLIN-ST. JEOR: SCORE: 1702.11

## 2021-07-26 NOTE — ANESTHESIA PROCEDURE NOTES
Intrathecal injection Procedure Note  Pre-Procedure   Staff -        Anesthesiologist:  Miya Rodríguez MD       Performed By: anesthesiologist       Location: OR       Procedure Start/Stop Times: 7/26/2021 11:02 AM and 7/26/2021 11:04 AM       Pre-Anesthestic Checklist: patient identified, IV checked, risks and benefits discussed, informed consent, monitors and equipment checked, pre-op evaluation, at physician/surgeon's request and post-op pain management  Timeout:       Correct Patient: Yes        Correct Procedure: Yes        Correct Site: Yes        Correct Position: Yes   Procedure Documentation  Procedure: intrathecal injection       Patient Position: sitting       Patient Prep/Sterile Barriers: sterile gloves, mask, patient draped       Skin prep: Chloraprep       Insertion Site: L3-4. (midline approach).       Needle Gauge: 22.        Needle Length (Inches): 4        Spinal Needle Type: Pencan      # of attempts: 1 and  # of redirects:     Assessment/Narrative         CSF fluid: clear.    Medication(s) Administered   0.75% Hyperbaric Bupivacaine (Intrathecal), 2 mL  Medication Administration Time: 7/26/2021 11:04 AM

## 2021-07-26 NOTE — CONSULTS
[facility] MEDICINE CONSULT NOTE   Physician requesting consult: Vamshi Romero MD    Reason for consult: Postoperative medical management of medical co-morbidities as below    Assessment and Plan    Lara Streeter is a 61 year old old female with a history of hypertension, GERD, underwent right total knee arthroplasty.  EBL 50 mL.  [facility] service was asked to evaluate patient for postoperative medical management for hypertension. Please resume the home medications as reconciled and further noted below with ordered hold parameters.  Vital signs have been stable post-operatively including hemodynamically stable, blood pressure and heart rate. Thank you for this consult; we will continue to follow this patient until discharge.    Essential hypertension  Atenolol 25 mg twice a day  Hold antihypertensive if systolic blood pressure is less than 110 as risk of postop hypotension    GERD  Resume PPI    Morbid obesity Body mass index is 40.22 kg/m .  Modification of lifestyle for weight loss    Status post right total knee arthroplasty  Resume routine postoperative care  Physical and Occupational Therapy  Use incentive spirometry frequently  DVT prophylaxis per orthopedics, aspirin 81 mg twice a day  Pain control with Tylenol 975 mg every 8 hours, 650 mg every 4 hours as needed, IV Dilaudid 0.2 to 0.4 mg every 2 hours as needed, oxycodone 5 to 10 mg every 4 hours as needed    Code status:Full Code       HISTORY     Ms.Concepcion JONNY Streeter is a 61 year old old female   history of hypertension, GERD, underwent right total knee arthroplasty.   EBL 50 mL.Orthopedic surgery/ Vamshi Titus requested consult for hypertension.  She is on atenolol 25 mg twice a day for hypertension.  Blood pressure is in higher side.  Remains asymptomatic.  On PPI for GERD.  Denies headache, chest pain, breathing difficulty, palpitation, nausea, vomiting, abdominal pain or urinary symptoms.  Does not have  history of heart disease, lung disease, diabetes, bleeding or clotting disorders or sleep apnea.  History is provided by the patient    Past Medical History     Patient Active Problem List    Diagnosis Date Noted     Primary osteoarthritis of right knee 07/26/2021     Priority: Medium     Vomiting 12/26/2018     Priority: Medium     Nausea and vomiting 12/26/2018     Priority: Medium     Benign essential hypertension      Priority: Medium     Accelerated hypertension 10/22/2018     Priority: Medium     Adjustment disorder with mixed anxiety and depressed mood      Priority: Medium     Abdominal pain, epigastric 09/18/2018     Priority: Medium     Gastrointestinal bleeding 09/17/2018     Priority: Medium     Rectal bleeding 09/17/2018     Priority: Medium     Added automatically from request for surgery [id number]         Anemia due to blood loss, acute      Priority: Medium     Gastroparesis 10/19/2017     Priority: Medium     Hypomagnesemia 10/18/2017     Priority: Medium     Relationship problem with family member      Priority: Medium     IBS (irritable bowel syndrome) 10/15/2017     Priority: Medium     History of atrial fibrillation 10/11/2017     Priority: Medium     Diarrhea 08/06/2015     Priority: Medium     Dehydration 08/06/2015     Priority: Medium     Dehydration, moderate 03/27/2015     Priority: Medium     Hypokalemia 03/27/2015     Priority: Medium     PAF (paroxysmal atrial fibrillation) (H) 03/27/2015     Priority: Medium     Gastritis, acute 12/18/2014     Priority: Medium     Diverticulitis 12/14/2014     Priority: Medium     Abdominal pain 08/22/2014     Priority: Medium     Nausea & vomiting 08/22/2014     Priority: Medium     Diverticulitis of colon 08/22/2014     Priority: Medium     HTN (hypertension) 08/22/2014     Priority: Medium     Obesity 08/22/2014     Priority: Medium        Surgical History   She  has a past surgical history that includes appendectomy; Cholecystectomy; Esophagoscopy,  gastroscopy, duodenoscopy (EGD), combined (N/A, 10/18/2017); and Sigmoidoscopy flexible (N/A, 9/18/2018).     Past Surgical History:   Procedure Laterality Date     APPENDECTOMY       CHOLECYSTECTOMY       ESOPHAGOSCOPY, GASTROSCOPY, DUODENOSCOPY (EGD), COMBINED N/A 10/18/2017    Procedure: ESOPHAGOGASTRODUODENOSCOPY (EGD) with biopsy;  Surgeon: Salima Gauthier MD;  Location: [facility];  Service:      SIGMOIDOSCOPY FLEXIBLE N/A 9/18/2018    Procedure: SIGMOIDOSCOPY with cautery using APC;  Surgeon: Ambrosio Tabor MD;  Location: [facility];  Service:        Family History    Reviewed, and family history includes Diabetes in her father, mother, and sister; Heart Disease in her mother.    Social History    Reviewed, and she  reports that she has never smoked. She has never used smokeless tobacco. She reports that she does not drink alcohol and does not use drugs.  Social History     Tobacco Use     Smoking status: Never Smoker     Smokeless tobacco: Never Used   Substance Use Topics     Alcohol use: No       Allergies     Allergies   Allergen Reactions     Flagyl [Metronidazole] Nausea and Vomiting     Lisinopril Nausea and Vomiting     Opioids - Morphine Analogues [Morphine] Nausea and Vomiting     Phenergan [Promethazine] Nausea and Vomiting       Prior to Admission Medications      Medications Prior to Admission   Medication Sig Dispense Refill Last Dose     amitriptyline (ELAVIL) 75 MG tablet [AMITRIPTYLINE (ELAVIL) 75 MG TABLET] Take 1 tablet (75 mg total) by mouth at bedtime. 90 tablet 0 7/25/2021 at Unknown time     atenolol (TENORMIN) 25 MG tablet [ATENOLOL (TENORMIN) 25 MG TABLET] Take 25 mg by mouth 2 (two) times a day.  2 7/26/2021 at am     ferrous sulfate 325 (65 FE) MG tablet [FERROUS SULFATE 325 (65 FE) MG TABLET] Take 1 tablet by mouth 2 (two) times a day with meals.   not started yet     ibuprofen (ADVIL/MOTRIN) 800 MG tablet Take 800 mg by mouth every 8 hours as needed for moderate pain    7/22/2021     omeprazole (PRILOSEC) 40 MG capsule [OMEPRAZOLE (PRILOSEC) 40 MG CAPSULE] Take 40 mg by mouth 2 (two) times a day before meals.   7/25/2021 at Unknown time     polyethylene glycol (MIRALAX) 17 gram packet [POLYETHYLENE GLYCOL (MIRALAX) 17 GRAM PACKET] Take 17 g by mouth daily as needed.   Past Week at Unknown time       Review of Systems   A 12 point comprehensive review of systems was negative except as noted above.    OBJECTIVE         Physical Exam   Temp:  [96.8  F (36  C)-98.6  F (37  C)] 98.5  F (36.9  C)  Pulse:  [61-74] 69  Resp:  [14-35] 19  BP: (104-182)/(53-97) 154/79  FiO2 (%):  [6 %] 6 %  SpO2:  [93 %-100 %] 94 %  Body mass index is 40.22 kg/m .  GENERAL:  Alert, appears comfortable, in no acute distress, appears stated age   HEAD:  Normocephalic, without obvious abnormality, atraumatic   EYES:  PERRL, conjunctiva  clear,  EOM's intact   NOSE: Nares normal,  mucosa normal, no drainage   THROAT: Lips, mucosa,  gums normal, mouth moist   NECK: Supple, symmetrical, trachea midline   BACK:   Symmetric, no curvature, ROM normal   LUNGS:   Clear to auscultation bilaterally, no rales, rhonchi, or wheezing, symmetric chest rise on inhalation, respirations unlabored   CHEST WALL:  No tenderness or deformity   HEART:  Regular rate and rhythm, S1 and S2 normal, no murmur, rub, or gallop    ABDOMEN:   Soft, non-tender, bowel sounds active , no masses, no organomegaly, no rebound or guarding   EXTREMITIES: Status post right total knee arthroplasty   SKIN: No rashes   NEURO: Alert, oriented x 4, non-focal   PSYCH: Cooperative, behavior is appropriate        Cardiographics Reviewed Personally By Myself     Imaging Reviewed Personally By Myself    Radiology Results:   Recent Results (from the past 24 hour(s))   XR Knee Port Right 1/2 Views    Narrative    EXAM: XR KNEE PORT RIGHT 1/2 VIEWS  LOCATION: [facility]  DATE/TIME: 7/26/2021 12:55 PM    INDICATION: Postop total  knee.  COMPARISON: None.      Impression    IMPRESSION: Postoperative changes of right total knee arthroplasty. Components appear well seated. Post procedural air within the joint and surrounding soft tissues.       Labs Reviewed Personally By Myself     Most Recent 3 CBC's:Recent Labs   Lab Test 07/26/21  0944 12/24/19  1647 01/09/19  1620   WBC 5.0 5.2 4.8   HGB 13.6 13.1 12.2   MCV 93 87 85    267 283     Most Recent 3 BMP's:Recent Labs   Lab Test 07/22/21  1625 06/23/21  1327 05/07/21  1101    137 139   POTASSIUM 4.1 4.4 4.2   CHLORIDE 106 104 106   CO2 26 23 25   BUN 15 10 13   CR 0.80 0.76 0.72   ANIONGAP 7 10 8   GAGE 10.4 9.9 9.8    129* 131*        Preoperative Labs Reviewed Personally By Myself     Thank you for this consultation.  Appreciate the opportunity to participate in the care of Lara Streeter, please feel free to contact us for any questions or concerns.    Lucia Dubon MD  [facility]  Phone: #[phone]

## 2021-07-26 NOTE — ANESTHESIA CARE TRANSFER NOTE
Patient: Lara Streeter    Procedure(s):  ARTHROPLASTY, RIGHT KNEE, TOTAL    Diagnosis: Right knee pain [M25.561]  Diagnosis Additional Information: No value filed.    Anesthesia Type:   Spinal     Note:    Oropharynx: oropharynx clear of all foreign objects and spontaneously breathing  Level of Consciousness: drowsy  Oxygen Supplementation: face mask  Level of Supplemental Oxygen (L/min / FiO2): 8  Independent Airway: airway patency satisfactory and stable  Dentition: dentition unchanged  Vital Signs Stable: post-procedure vital signs reviewed and stable  Report to RN Given: handoff report given  Patient transferred to: PACU    Handoff Report: Set expectations for post-procedure period      Vitals:  Vitals Value Taken Time   /53 07/26/21 1235   Temp 36  C (96.8  F) 07/26/21 1235   Pulse 62 07/26/21 1236   Resp 20 07/26/21 1236   SpO2 100 % 07/26/21 1236   Vitals shown include unvalidated device data.    Electronically Signed By: DEEPTI Wagner CRNA  July 26, 2021  12:38 PM

## 2021-07-26 NOTE — ANESTHESIA PROCEDURE NOTES
Adductor canal Procedure Note  Pre-Procedure   Staff -        Anesthesiologist:  Miya Rodríguez MD       Performed By: anesthesiologist       Location: pre-op       Procedure Start/Stop Times: 7/26/2021 10:51 AM and 7/26/2021 10:54 AM       Pre-Anesthestic Checklist: patient identified, IV checked, site marked, risks and benefits discussed, informed consent, monitors and equipment checked, pre-op evaluation, at physician/surgeon's request and post-op pain management  Timeout:       Correct Patient: Yes        Correct Procedure: Yes        Correct Site: Yes        Correct Position: Yes        Correct Laterality: Yes        Site Marked: Yes  Procedure Documentation  Procedure: Adductor canal       Laterality: right       Patient Position: supine       Patient Prep/Sterile Barriers: sterile gloves, mask, patient draped       Skin prep: Chloraprep       Needle Gauge: 20.        Needle Length (Inches): 4        Ultrasound guided       1. Ultrasound was used to identify targeted nerve, plexus, vascular marker, or fascial plane and place a needle adjacent to it in real-time.       2. Ultrasound was used to visualize the spread of anesthetic in close proximity to the above referenced structure.       3. A permanent image is entered into the patient's record.       4. The visualized anatomic structures appeared normal.       5. There were no apparent abnormal pathologic findings.    Assessment/Narrative       Bolus given via needle..        Secured via.        Complications: none    Medication(s) Administered   Bupivacaine 0.5% w/ 1:400K Epi (Injection), 15 mL

## 2021-07-26 NOTE — OP NOTE
Operative Note    Name:  Lara Streeter  Location: [facility] Main OR  Procedure Date:  7/26/2021  PCP:  Daphney Grewal      Procedure(s):  ARTHROPLASTY, RIGHT KNEE, TOTAL     Implant Name Type Inv. Item Serial No.  Lot No. LRB No. Used Action   BONE CEMENT SIMPLEX FULL DOSE 6191-1-001 - [id number] Cement, Bone BONE CEMENT SIMPLEX FULL DOSE 6191-1-001  [employer] [id number] Right 2 Implanted   IMP BASEPLATE TIBIAL HOWM TRI 5 5520-B-500 - [id number] Total Joint Component/Insert IMP BASEPLATE TIBIAL HOWM TRI 5 5520-B-500  [employer] [id number] Right 1 Implanted   IMP COMP FEM STRK TRIATHLN CR RT 5 5510-F-502 - [id number] Total Joint Component/Insert IMP COMP FEM STRK TRIATHLN CR RT 5 5510-F-502  [employer] [id number] Right 1 Implanted   TRIATHLON X3 TIBIAL BEARING INSERT-CS SIZE 5 9MM    [employer] [id number] Right 1 Implanted       Pre-Procedure Diagnosis:  Right knee pain [M25.561]     Post-Procedure Diagnosis:    Right knee osteoarthritis    Surgeon(s):  Vamshi Romero MD      Assistants:  Ambrosio Tan PA-C, Patience Crockett CST for patient positioning, intraoperative retraction, patient safety, and wound closure.    Anesthesia Type:  Spinal with Adductor Block     Findings:  Arthritis    Operative Report:      After satisfactory infiltration of regional block anesthetic in the preoperative holding area, the patient was taken to the operating room.  Spinal anesthesia was administered.  The patient's operative extremity was then prepped and draped sterile.  A timeout was then taken to ensure proper surgical site.  A medial parapatellar arthrotomy was then performed.  The menisci and fat pad were sacrificed.  The distal femoral cut was made taking 8mm off, 5 degrees valgus.  Osteophytes were then removed.  Proximal tibia cut was then made.  The proper rotation of the distal femur was then set, sized, chamfer cuts were made.  At this point an intraoperative standardized  cocktail was infiltrated around the periarticular soft tissues.  Trials were implanted.  Patella was everted and denervated with cautery.  Trials showed excellent flexion and extension gaps, excellent range of motion with excellent patellar tracking.  Trials were removed and the tibial keel punch was then made.  The wound then copiously irrigated.  Components then impacted into place.  The wound irrigated once more.  The wound then closed in layers: #1 Vicryl for the extensor mechanism, 2-0 Vicryl for the subcutaneous tissues, 3-0 Monocryl for the skin followed by Dermabond.  Sterile dressings were applied.  The patient was then awakened and taken to the [facility] stable.    Plan:  Initiate DVT prophylaxis protocol including early ambulation, mechanical and chemical prophylaxis.    Estimated Blood Loss:   50cc       Complications:    None    Vamshi Romero MD     Date: 7/26/2021  Time: 12:00 PM

## 2021-07-26 NOTE — ANESTHESIA PREPROCEDURE EVALUATION
Anesthesia Pre-Procedure Evaluation    Patient: Lara Streeter   MRN: [id number] : 1960        Preoperative Diagnosis: Right knee pain [M25.561]   Procedure : Procedure(s):  ARTHROPLASTY, KNEE, TOTAL     Past Medical History:   Diagnosis Date     Anemia      Anxiety      Cervical cancer (H)      Depression      Diverticulitis      Gastroparesis      GERD (gastroesophageal reflux disease)      Hypertension      IBS (irritable bowel syndrome)      Morbid obesity (H)      Paroxysmal atrial fibrillation (H)       Past Surgical History:   Procedure Laterality Date     APPENDECTOMY       CHOLECYSTECTOMY       ESOPHAGOSCOPY, GASTROSCOPY, DUODENOSCOPY (EGD), COMBINED N/A 10/18/2017    Procedure: ESOPHAGOGASTRODUODENOSCOPY (EGD) with biopsy;  Surgeon: Salima Gauthier MD;  Location: [facility];  Service:      SIGMOIDOSCOPY FLEXIBLE N/A 2018    Procedure: SIGMOIDOSCOPY with cautery using APC;  Surgeon: Ambrosio Tabor MD;  Location: [facility];  Service:       Allergies   Allergen Reactions     Flagyl [Metronidazole] Nausea and Vomiting     Lisinopril Nausea and Vomiting     Opioids - Morphine Analogues [Morphine] Nausea and Vomiting     Phenergan [Promethazine] Nausea and Vomiting      Social History     Tobacco Use     Smoking status: Never Smoker     Smokeless tobacco: Never Used   Substance Use Topics     Alcohol use: No      Wt Readings from Last 1 Encounters:   21 113 kg (249 lb 3.2 oz)        Anesthesia Evaluation   Pt has had prior anesthetic.         ROS/MED HX  ENT/Pulmonary:  - neg pulmonary ROS     Neurologic:  - neg neurologic ROS     Cardiovascular:     (+) hypertension-----    METS/Exercise Tolerance:     Hematologic:  - neg hematologic  ROS     Musculoskeletal:       GI/Hepatic:     (+) GERD,     Renal/Genitourinary:       Endo:     (+) Obesity,     Psychiatric/Substance Use:       Infectious Disease:       Malignancy:       Other:            Physical Exam    Airway         Mallampati: II   TM distance: > 3 FB   Neck ROM: full     Respiratory Devices and Support         Dental       (+) upper dentures and lower dentures      Cardiovascular          Rhythm and rate: regular and normal     Pulmonary           breath sounds clear to auscultation           OUTSIDE LABS:  CBC:   Lab Results   Component Value Date    WBC 5.0 07/26/2021    WBC 5.2 12/24/2019    HGB 13.6 07/26/2021    HGB 13.1 12/24/2019    HCT 39.5 07/26/2021    HCT 37.2 12/24/2019     07/26/2021     12/24/2019     BMP:   Lab Results   Component Value Date     07/22/2021     06/23/2021    POTASSIUM 4.1 07/22/2021    POTASSIUM 4.4 06/23/2021    CHLORIDE 106 07/22/2021    CHLORIDE 104 06/23/2021    CO2 26 07/22/2021    CO2 23 06/23/2021    BUN 15 07/22/2021    BUN 10 06/23/2021    CR 0.80 07/22/2021    CR 0.76 06/23/2021     07/22/2021     (H) 06/23/2021     COAGS:   Lab Results   Component Value Date    PTT 26 12/26/2018    INR 1.01 12/26/2018     POC: No results found for: BGM, HCG, HCGS  HEPATIC:   Lab Results   Component Value Date    ALBUMIN 3.6 07/22/2021    PROTTOTAL 7.5 04/21/2020    ALT 31 04/21/2020    AST 18 04/21/2020    ALKPHOS 166 (H) 04/21/2020    BILITOTAL 0.4 04/21/2020     OTHER:   Lab Results   Component Value Date    LACT 1.4 12/26/2018    A1C 5.3 10/19/2017    GAGE 10.4 07/22/2021    PHOS 2.8 07/22/2021    MAG 1.9 12/24/2019    LIPASE 19 01/09/2019    AMYLASE 19 07/20/2018    TSH 0.90 12/16/2018    SED 35 (H) 07/20/2018       Anesthesia Plan    ASA Status:  3      Anesthesia Type: Spinal.              Consents    Anesthesia Plan(s) and associated risks, benefits, and realistic alternatives discussed. Questions answered and patient/representative(s) expressed understanding.     - Discussed with:  Patient         Postoperative Care    Pain management: Peripheral nerve block (Single Shot).        Comments:    kelley Rodríguez MD

## 2021-07-26 NOTE — PHARMACY-ADMISSION MEDICATION HISTORY
Pharmacy Note - Admission Medication History    Pertinent Provider Information:      ______________________________________________________________________    Prior To Admission (PTA) med list completed and updated in EMR.       Prior to Admission Medications   Prescriptions Last Dose Informant Patient Reported? Taking?   amitriptyline (ELAVIL) 75 MG tablet 7/25/2021 at Unknown time  No Yes   Sig: [AMITRIPTYLINE (ELAVIL) 75 MG TABLET] Take 1 tablet (75 mg total) by mouth at bedtime.   atenolol (TENORMIN) 25 MG tablet 7/26/2021 at am  Yes Yes   Sig: [ATENOLOL (TENORMIN) 25 MG TABLET] Take 25 mg by mouth 2 (two) times a day.   ferrous sulfate 325 (65 FE) MG tablet not started yet  Yes Yes   Sig: [FERROUS SULFATE 325 (65 FE) MG TABLET] Take 1 tablet by mouth 2 (two) times a day with meals.   ibuprofen (ADVIL/MOTRIN) 800 MG tablet 7/22/2021  Yes Yes   Sig: Take 800 mg by mouth every 8 hours as needed for moderate pain   omeprazole (PRILOSEC) 40 MG capsule 7/25/2021 at Unknown time  Yes Yes   Sig: [OMEPRAZOLE (PRILOSEC) 40 MG CAPSULE] Take 40 mg by mouth 2 (two) times a day before meals.   polyethylene glycol (MIRALAX) 17 gram packet Past Week at Unknown time  Yes Yes   Sig: [POLYETHYLENE GLYCOL (MIRALAX) 17 GRAM PACKET] Take 17 g by mouth daily as needed.      Facility-Administered Medications: None       Information source(s): Patient and CareEverywhere/[facility]  Method of interview communication: in-person      Patient was asked about OTC/herbal products specifically.  PTA med list reflects this.    In the past week, patient estimated taking medication this percent of the time:  greater than 90%.    Allergies were reviewed, assessed, and updated with the patient.      Patient does not use any multi-dose medications prior to admission.    The information provided in this note is only as accurate as the sources available at the time of the update(s).    Thank you for the opportunity to participate in the care of this  patient.    Santhosh Nicholson, [facility]  7/26/2021 10:12 AM

## 2021-07-26 NOTE — TREATMENT PLAN
Orthopedic Surgery Pre-Op Plan: Lara Streeter  pre-op review. This is NOT an H&P   Surgeon: Dr. Romero   [facility]: [facility]  Name of Surgery: Right Total Knee Arthroplasty   Date of Surgery: 7/26/21   H&P: Completed 7/22/21 by Dr. Stephen at [facility].  History of ASA, NSAIDS, vitamin and/or herbal supplements within 10 days: Yes- Ibuprofen-instructed to hold this med for 7 days before surgery.  History of blood thinners: No    Plan:   1) Discharge Plan: Home with assist of Family (Children). Please see Discharge Planning section near bottom of this note for further details.     2) Atrial Fibrillation: listed as paroxysmal. On atenolol but not on any any anticoagulant medication. Did not see any recent cardiology notes.     3) Hypertension: BPs elevated at 160/80 and 150/80 at preop exam.  On atenolol.  Will monitor BPs closely during hospital stay.  If BPs consistently elevated, nursing to please notify hospitalist.    4) GERD: on omeprazole.     5) Morbid Obesity: BMI 40, Wt: 250 lbs. I recommend continued efforts at safe weight loss following recovery from surgery. If patient is interested in further assistance with weight loss, please consider referral to [facility]. They offer both non-surgical and surgical evidence-based weight loss strategies. Call [phone] to schedule a consultation to learn more.      Patient appears medically optimized for upcoming surgery. I would recommend Hospitalist Consult to assist with medical management. Please call me below with any questions on this patient.       Review of Systems Notable for: Atrial fibrillation-paroxysmal, hypertension, GERD, morbid obesity.    Past Medical History:   Past Medical History:   Diagnosis Date     Anemia      Anxiety      Cervical cancer (H)      Depression      Diverticulitis      Gastroparesis      GERD (gastroesophageal reflux disease)      Hypertension      IBS  (irritable bowel syndrome)      Morbid obesity (H)      Paroxysmal atrial fibrillation (H)      Past Surgical History:   Procedure Laterality Date     APPENDECTOMY       CHOLECYSTECTOMY       ESOPHAGOSCOPY, GASTROSCOPY, DUODENOSCOPY (EGD), COMBINED N/A 10/18/2017    Procedure: ESOPHAGOGASTRODUODENOSCOPY (EGD) with biopsy;  Surgeon: Salima Gauthier MD;  Location: [facility];  Service:      SIGMOIDOSCOPY FLEXIBLE N/A 9/18/2018    Procedure: SIGMOIDOSCOPY with cautery using APC;  Surgeon: Ambrosio Tabor MD;  Location: [facility];  Service:        Current Medications:  Patient's Medications   New Prescriptions    No medications on file   Previous Medications    ACETAMINOPHEN (TYLENOL) 500 MG TABLET    [ACETAMINOPHEN (TYLENOL) 500 MG TABLET] Take 500 mg by mouth every 6 (six) hours as needed for pain.    AMITRIPTYLINE (ELAVIL) 75 MG TABLET    [AMITRIPTYLINE (ELAVIL) 75 MG TABLET] Take 1 tablet (75 mg total) by mouth at bedtime.    ATENOLOL (TENORMIN) 25 MG TABLET    [ATENOLOL (TENORMIN) 25 MG TABLET] Take 25 mg by mouth 2 (two) times a day.    FERROUS SULFATE 325 (65 FE) MG TABLET    [FERROUS SULFATE 325 (65 FE) MG TABLET] Take 1 tablet by mouth 2 (two) times a day with meals.    IBUPROFEN (ADVIL,MOTRIN) 600 MG TABLET    [IBUPROFEN (ADVIL,MOTRIN) 600 MG TABLET] Take 600 mg by mouth every 6 (six) hours as needed for pain.    OMEPRAZOLE (PRILOSEC) 40 MG CAPSULE    [OMEPRAZOLE (PRILOSEC) 40 MG CAPSULE] Take 40 mg by mouth 2 (two) times a day before meals.    POLYETHYLENE GLYCOL (MIRALAX) 17 GRAM PACKET    [POLYETHYLENE GLYCOL (MIRALAX) 17 GRAM PACKET] Take 17 g by mouth daily as needed.   Modified Medications    No medications on file   Discontinued Medications    ALBUTEROL (PROAIR HFA;PROVENTIL HFA;VENTOLIN HFA) 90 MCG/ACTUATION INHALER    [ALBUTEROL (PROAIR HFA;PROVENTIL HFA;VENTOLIN HFA) 90 MCG/ACTUATION INHALER] Inhale 1-2 puffs every 6 (six) hours as needed for wheezing.    AMLODIPINE (NORVASC) 10 MG TABLET     [AMLODIPINE (NORVASC) 10 MG TABLET] Take 1 tablet (10 mg total) by mouth daily.    CALCIUM POLYCARBOPHIL (FIBERCON) 625 MG TABLET    [CALCIUM POLYCARBOPHIL (FIBERCON) 625 MG TABLET] Take 1,250 mg by mouth daily.    DICYCLOMINE (BENTYL) 20 MG TABLET    [DICYCLOMINE (BENTYL) 20 MG TABLET] Take 20 mg by mouth 4 (four) times a day before meals and at bedtime.    FAMOTIDINE (PEPCID) 20 MG TABLET    [FAMOTIDINE (PEPCID) 20 MG TABLET] Take 20 mg by mouth 2 (two) times a day.    LINACLOTIDE (LINZESS) 290 MCG CAP CAPSULE    [LINACLOTIDE (LINZESS) 290 MCG CAP CAPSULE] Take 1 capsule (290 mcg total) by mouth daily before breakfast.    ONDANSETRON (ZOFRAN) 4 MG TABLET    [ONDANSETRON (ZOFRAN) 4 MG TABLET] Take 4 mg by mouth every 6 (six) hours as needed.    TRAMADOL (ULTRAM) 50 MG TABLET    [TRAMADOL (ULTRAM) 50 MG TABLET] Take 50 mg by mouth every 4 (four) hours as needed for pain.       ALLERGIES:  Allergies   Allergen Reactions     Flagyl [Metronidazole] Nausea and Vomiting     Lisinopril Nausea and Vomiting     Opioids - Morphine Analogues [Morphine] Nausea and Vomiting     Phenergan [Promethazine] Nausea and Vomiting       Social History  Social History     Tobacco Use     Smoking status: Never Smoker     Smokeless tobacco: Never Used   Substance Use Topics     Alcohol use: No     Drug use: No       Any Abnormal Recent Diagnostics? No    Discharge Planning:   Discharge plan according to [facility]:          07/21/21 1227   Discharge Planning   Patient/Family Anticipates Transition to home with family   Living Arrangements   People in home child(ashley), adult   Type of Residence Private Residence   Is your private residence a single family home or apartment? Single family home   Bathroom Shower/Tub Tub/Shower unit   Equipment Currently Used at Home none   Support System   Support Systems Children   Do you have someone available to stay with you one or two nights once you are home? Yes            DEEPTI Schroeder,  CNP   Advanced Practice Nurse Navigator- Orthopedics  [facility]   Phone: [phone]

## 2021-07-26 NOTE — ANESTHESIA POSTPROCEDURE EVALUATION
Patient: Lara Streeter    Procedure(s):  ARTHROPLASTY, RIGHT KNEE, TOTAL    Diagnosis:Right knee pain [M25.561]  Diagnosis Additional Information: No value filed.    Anesthesia Type:  Spinal    Note:  Disposition: Inpatient   Postop Pain Control: Uneventful            Sign Out: Well controlled pain   PONV: No   Neuro/Psych: Uneventful            Sign Out: Acceptable/Baseline neuro status   Airway/Respiratory: Uneventful            Sign Out: AIRWAY IN SITU/Resp. Support   CV/Hemodynamics: Uneventful            Sign Out: Acceptable CV status; No obvious hypovolemia; No obvious fluid overload   Other NRE:    DID A NON-ROUTINE EVENT OCCUR? No           Last vitals:  Vitals Value Taken Time   /56 07/26/21 1310   Temp 36.1  C (97  F) 07/26/21 1310   Pulse 63 07/26/21 1315   Resp 19 07/26/21 1315   SpO2 96 % 07/26/21 1315   Vitals shown include unvalidated device data.    Electronically Signed By: Miya Rodríguez MD  July 26, 2021  3:13 PM

## 2021-07-27 ENCOUNTER — APPOINTMENT (OUTPATIENT)
Dept: OCCUPATIONAL THERAPY | Facility: CLINIC | Age: 61
End: 2021-07-27
Attending: PHYSICIAN ASSISTANT
Payer: COMMERCIAL

## 2021-07-27 ENCOUNTER — APPOINTMENT (OUTPATIENT)
Dept: PHYSICAL THERAPY | Facility: CLINIC | Age: 61
End: 2021-07-27
Attending: ORTHOPAEDIC SURGERY
Payer: COMMERCIAL

## 2021-07-27 VITALS
SYSTOLIC BLOOD PRESSURE: 147 MMHG | HEIGHT: 66 IN | BODY MASS INDEX: 40.05 KG/M2 | WEIGHT: 249.2 LBS | TEMPERATURE: 98 F | RESPIRATION RATE: 18 BRPM | HEART RATE: 63 BPM | DIASTOLIC BLOOD PRESSURE: 65 MMHG | OXYGEN SATURATION: 97 %

## 2021-07-27 PROBLEM — Z96.651 STATUS POST TOTAL RIGHT KNEE REPLACEMENT: Status: ACTIVE | Noted: 2021-07-27

## 2021-07-27 LAB
ANION GAP SERPL CALCULATED.3IONS-SCNC: 4 MMOL/L (ref 5–18)
BUN SERPL-MCNC: 9 MG/DL (ref 8–22)
CALCIUM SERPL-MCNC: 9.3 MG/DL (ref 8.5–10.5)
CHLORIDE BLD-SCNC: 106 MMOL/L (ref 98–107)
CO2 SERPL-SCNC: 27 MMOL/L (ref 22–31)
CREAT SERPL-MCNC: 0.81 MG/DL (ref 0.6–1.1)
ERYTHROCYTE [DISTWIDTH] IN BLOOD BY AUTOMATED COUNT: 12.3 % (ref 10–15)
GFR SERPL CREATININE-BSD FRML MDRD: 78 ML/MIN/1.73M2
GLUCOSE BLD-MCNC: 138 MG/DL (ref 70–125)
HCT VFR BLD AUTO: 35.4 % (ref 35–47)
HGB BLD-MCNC: 11.8 G/DL (ref 11.7–15.7)
MCH RBC QN AUTO: 32.4 PG (ref 26.5–33)
MCHC RBC AUTO-ENTMCNC: 33.3 G/DL (ref 31.5–36.5)
MCV RBC AUTO: 97 FL (ref 78–100)
PLATELET # BLD AUTO: 172 10E3/UL (ref 150–450)
POTASSIUM BLD-SCNC: 4.3 MMOL/L (ref 3.5–5)
RBC # BLD AUTO: 3.64 10E6/UL (ref 3.8–5.2)
SODIUM SERPL-SCNC: 137 MMOL/L (ref 136–145)
WBC # BLD AUTO: 5.1 10E3/UL (ref 4–11)

## 2021-07-27 PROCEDURE — 250N000013 HC RX MED GY IP 250 OP 250 PS 637: Performed by: FAMILY MEDICINE

## 2021-07-27 PROCEDURE — 97166 OT EVAL MOD COMPLEX 45 MIN: CPT | Mod: GO

## 2021-07-27 PROCEDURE — 80048 BASIC METABOLIC PNL TOTAL CA: CPT | Performed by: FAMILY MEDICINE

## 2021-07-27 PROCEDURE — 250N000011 HC RX IP 250 OP 636: Performed by: PHYSICIAN ASSISTANT

## 2021-07-27 PROCEDURE — 97535 SELF CARE MNGMENT TRAINING: CPT | Mod: GO

## 2021-07-27 PROCEDURE — 250N000013 HC RX MED GY IP 250 OP 250 PS 637: Performed by: PHYSICIAN ASSISTANT

## 2021-07-27 PROCEDURE — 36415 COLL VENOUS BLD VENIPUNCTURE: CPT | Performed by: FAMILY MEDICINE

## 2021-07-27 PROCEDURE — 97110 THERAPEUTIC EXERCISES: CPT | Mod: GP

## 2021-07-27 PROCEDURE — 97161 PT EVAL LOW COMPLEX 20 MIN: CPT | Mod: GP

## 2021-07-27 PROCEDURE — 85027 COMPLETE CBC AUTOMATED: CPT | Performed by: FAMILY MEDICINE

## 2021-07-27 PROCEDURE — 250N000013 HC RX MED GY IP 250 OP 250 PS 637: Performed by: ANESTHESIOLOGY

## 2021-07-27 PROCEDURE — 97116 GAIT TRAINING THERAPY: CPT | Mod: GP

## 2021-07-27 RX ORDER — ACETAMINOPHEN 325 MG/1
975 TABLET ORAL EVERY 8 HOURS
Qty: 90 TABLET | Refills: 0 | Status: SHIPPED | OUTPATIENT
Start: 2021-07-27

## 2021-07-27 RX ADMIN — ASPIRIN 81 MG: 81 TABLET ORAL at 08:57

## 2021-07-27 RX ADMIN — PANTOPRAZOLE SODIUM 40 MG: 20 TABLET, DELAYED RELEASE ORAL at 06:57

## 2021-07-27 RX ADMIN — KETOROLAC TROMETHAMINE 15 MG: 30 INJECTION, SOLUTION INTRAMUSCULAR; INTRAVENOUS at 00:00

## 2021-07-27 RX ADMIN — OXYCODONE HYDROCHLORIDE 10 MG: 5 TABLET ORAL at 03:18

## 2021-07-27 RX ADMIN — ACETAMINOPHEN 975 MG: 325 TABLET ORAL at 00:00

## 2021-07-27 RX ADMIN — CEFAZOLIN SODIUM 2 G: 2 INJECTION, SOLUTION INTRAVENOUS at 03:22

## 2021-07-27 RX ADMIN — ATENOLOL 25 MG: 25 TABLET ORAL at 08:57

## 2021-07-27 RX ADMIN — OXYCODONE HYDROCHLORIDE 5 MG: 5 TABLET ORAL at 08:58

## 2021-07-27 RX ADMIN — OXYCODONE HYDROCHLORIDE 10 MG: 5 TABLET ORAL at 12:54

## 2021-07-27 RX ADMIN — FERROUS SULFATE TAB 325 MG (65 MG ELEMENTAL FE) 325 MG: 325 (65 FE) TAB at 08:57

## 2021-07-27 RX ADMIN — KETOROLAC TROMETHAMINE 15 MG: 30 INJECTION, SOLUTION INTRAMUSCULAR; INTRAVENOUS at 06:55

## 2021-07-27 NOTE — PROGRESS NOTES
07/27/21 0900   Quick Adds   Type of Visit Initial PT Evaluation   Living Environment   People in home child(ashley), adult  (daughter)   Current Living Arrangements apartment   Home Accessibility stairs to enter home   Number of Stairs, Main Entrance 6   Stair Railings, Main Entrance railing on left side (ascending)   Transportation Anticipated family or friend will provide   Self-Care   Usual Activity Tolerance good   Disability/Function   Hearing Difficulty or Deaf no   Wear Glasses or Blind no   Walking or Climbing Stairs Difficulty no   Dressing/Bathing Difficulty no   Toileting issues no   Doing Errands Independently Difficulty (such as shopping) yes   Fall history within last six months no   General Information   Onset of Illness/Injury or Date of Surgery 07/26/21   Referring Physician Vamshi Romero   Patient/Family Therapy Goals Statement (PT) go home   Pertinent History of Current Problem (include personal factors and/or comorbidities that impact the POC) R TKA   Weight-Bearing Status - RLE weight-bearing as tolerated   Cognition   Orientation Status (Cognition) oriented x 4   Pain Assessment   Patient Currently in Pain Yes, see Vital Sign flowsheet   Range of Motion (ROM)   ROM Comment   (WFL ,except R knee flexion 95 seated)   Strength   Strength Comments WFL except R knee   Bed Mobility   Comment (Bed Mobility) N/A -pt up in chair   Sit-Stand Transfer   Sit-Stand [place] (Transfers) supervision;verbal cues   Assistive Device (Sit-Stand Transfers) walker, front-wheeled   Gait/Stairs (Locomotion)   [place] Level (Gait) supervision   Assistive Device (Gait) walker, front-wheeled   Distance in Feet (Required for LE Total Joints) 200   Pattern (Gait) step-through   Deviations/Abnormal Patterns (Gait) antalgic;gait speed decreased;stride length decreased   [place] Level (Stairs) supervision   Handrail Location (Stairs) left side (ascending);right side (descending)  (both hands on rail)   Number  of Steps (Stairs) 8   Clinical Impression   Criteria for Skilled Therapeutic Intervention evaluation only   PT Diagnosis (PT) impaired mobility   Influenced by the following impairments weakness ,pain   Functional limitations due to impairments transfers ,amb   Clinical Presentation Stable/Uncomplicated   Clinical Presentation Rationale pt presents as medically diagnosed   Clinical Decision Making (Complexity) low complexity   Therapy Frequency (PT) One time eval and treatment only   Predicted Duration of Therapy Intervention (days/wks) 1 day   Planned Therapy Interventions (PT) gait training;home exercise program;ROM (range of motion);stair training;strengthening   Anticipated Equipment Needs at Discharge (PT) walker, rolling   Risk & Benefits of therapy have been explained evaluation/treatment results reviewed;care plan/treatment goals reviewed;risks/benefits reviewed;current/potential barriers reviewed;participants voiced agreement with care plan;participants included;patient   PT Discharge Planning    PT Discharge Recommendation (DC Rec) home with assist;home with outpatient physical therapy   PT Rationale for DC Rec patient doing well with functional mobility to retuen home with daughter and OP PT to cont tKA rehab   Total Evaluation Time   Total Evaluation Time (Minutes) 20

## 2021-07-27 NOTE — PLAN OF CARE
Patient vital signs are at baseline: Yes - elevated BP due to pain, IVF stopped  Patient able to ambulate as they were prior to admission or with assist devices provided by therapies during their stay:  Yes  Patient MUST void prior to discharge:  Yes  Patient able to tolerate oral intake:  Yes  Pain has adequate pain control using Oral analgesics:  Yes     Plan to discharge home today.

## 2021-07-27 NOTE — PROGRESS NOTES
[facility] MEDICINE PROGRESS NOTE      Identification/Summary: Lara Streeter is a 61 year old female with a past medical history of hypertension, GERD, obesity, underwent right total knee arthroplasty.  POD 1.  Right knee pain is stable.  Will be discharging home today.    Assessment and Plan:  Essential hypertension  Atenolol 25 mg twice a day  Blood pressure is stable    GERD  Resume PPI    Morbid obesity with BMI 40.22 kg/m   Modification of lifestyle for weight loss  Outpatient sleep study to rule out obstructive sleep apnea    Status post right total knee arthroplasty  Resume routine postoperative care  Physical and Occupational Therapy  Use incentive spirometry frequently  DVT prophylaxis per orthopedics, aspirin 81 mg twice a day  Pain control with Tylenol 975 mg every 8 hours, 650 mg every 4 hours as needed, oxycodone 5 to 10 mg every 4 hours as needed  Hemoglobin is stable at 11.8    Interval History/Subjective:  Resting comfortably.  Right knee pain is stable.  No new concern.      Review of system  Rest of the review of system are negative except HPI.  Denies headache, chest pain, breathing difficulty, palpitation, nausea, vomiting, abdominal pain or urinary symptoms.    Physical Exam/Objective:  Temp:  [96.8  F (36  C)-98.5  F (36.9  C)] 98  F (36.7  C)  Pulse:  [61-74] 63  Resp:  [16-22] 18  BP: (104-187)/(53-97) 147/65  SpO2:  [93 %-100 %] 97 %    Body mass index is 40.22 kg/m .     GENERAL:  Alert, appears comfortable, in no acute distress, appears stated age, obese   HEAD:  Normocephalic, without obvious abnormality, atraumatic   EYES:  PERRL, conjunctiva  clear,  EOM's intact   NOSE: Nares normal,  mucosa normal, no drainage   THROAT: Lips, mucosa,  gums normal, mouth moist   NECK: Supple, symmetrical, trachea midline   BACK:   Symmetric, no curvature, ROM normal   LUNGS:   Clear to auscultation bilaterally, no rales, rhonchi, or wheezing, symmetric chest rise on  inhalation, respirations unlabored   CHEST WALL:  No tenderness or deformity   HEART:  Regular rate and rhythm, S1 and S2 normal, no murmur, rub, or gallop    ABDOMEN:   Soft, non-tender, bowel sounds active , no masses, no organomegaly, no rebound or guarding   EXTREMITIES: Status post right total knee arthroplasty   SKIN: No rashes   NEURO: Alert, oriented x3    PSYCH: Cooperative, behavior is appropriate      Medications:   Personally Reviewed.  Medications     lactated ringers Stopped (07/27/21 0422)       acetaminophen  975 mg Oral Q8H     amitriptyline  75 mg Oral At Bedtime     aspirin  81 mg Oral BID     atenolol  25 mg Oral BID     ferrous sulfate  325 mg Oral BID w/meals     ketorolac  15 mg Intravenous Q6H     pantoprazole  40 mg Oral BID AC     polyethylene glycol  17 g Oral Daily     senna-docusate  1 tablet Oral BID     sodium chloride (PF)  3 mL Intracatheter Q8H     sodium chloride (PF)  3 mL Intracatheter Q8H       Data reviewed today: I personally reviewed all new medications, labs, imaging/diagnostics reports over the past 24 hours. Pertinent findings include:    Imaging:   Recent Results (from the past 24 hour(s))   XR Knee Port Right 1/2 Views    Narrative    EXAM: XR KNEE PORT RIGHT 1/2 VIEWS  LOCATION: [facility]  DATE/TIME: 7/26/2021 12:55 PM    INDICATION: Postop total knee.  COMPARISON: None.      Impression    IMPRESSION: Postoperative changes of right total knee arthroplasty. Components appear well seated. Post procedural air within the joint and surrounding soft tissues.       Labs:  Most Recent 3 CBC's:Recent Labs   Lab Test 07/27/21  0725 07/26/21  0944 12/24/19  1647   WBC 5.1 5.0 5.2   HGB 11.8 13.6 13.1   MCV 97 93 87    228 267     Most Recent 3 BMP's:Recent Labs   Lab Test 07/27/21  0725 07/22/21  1625 06/23/21  1327    139 137   POTASSIUM 4.3 4.1 4.4   CHLORIDE 106 106 104   CO2 27 26 23   BUN 9 15 10   CR 0.81 0.80 0.76   ANIONGAP 4* 7 10    GAGE 9.3 10.4 9.9   * 106 129*       Lucia Dubon MD  [facility]  [facility]  Phone: #[phone]

## 2021-07-27 NOTE — PLAN OF CARE
Patient is discharging home. Patient was given discharge information and prescriptions.    KIMO WEAVER RN

## 2021-07-27 NOTE — PROGRESS NOTES
07/27/21 1028   Quick Adds   Quick Adds Certification   Living Environment   People in home child(ashley), adult  (daughter)   Current Living Arrangements apartment   Home Accessibility stairs to enter home   Number of Stairs, Main Entrance 6   Stair Railings, Main Entrance railing on left side (ascending)   Self-Care   Equipment Currently Used at Home tub bench  (standard toilet)   Activity/Exercise/Self-Care Comment pt typically indep with all ADL/IADL.   General Information   Onset of Illness/Injury or Date of Surgery 07/27/21   Referring Physician Vamshi Mcintosh MD   Patient/Family Therapy Goal Statement (OT) ok to dress now   Additional Occupational Profile Info/Pertinent History of Current Problem moderate   Existing Precautions/Restrictions weight bearing   Left Upper Extremity (Weight-bearing Status) weight-bearing as tolerated (WBAT)   Right Lower Extremity (Weight-bearing Status) weight-bearing as tolerated (WBAT)   Cognitive Status Examination   Orientation Status orientation to person, place and time   Affect/Mental Status (Cognitive) WNL   Range of Motion Comprehensive   General Range of Motion no range of motion deficits identified   Strength Comprehensive (MMT)   General Manual Muscle Testing (MMT) Assessment no strength deficits identified   Coordination   Upper Extremity Coordination No deficits were identified   Bed Mobility   Bed Mobility No deficits identified   Transfers   Transfers No deficits identified   Balance   Balance Assessment no deficits were identified   Activities of Daily Living   BADL Assessment No deficits identified  (educ in safe tech)   Clinical Impression   Criteria for Skilled Therapeutic Interventions Met (OT) yes   OT Diagnosis decreased ADL independence   OT Problem List-Impairments impacting ADL activity tolerance impaired;flexibility;mobility;post-surgical precautions   Assessment of Occupational Performance 1-3 Performance Deficits   Planned Therapy Interventions (OT)  ADL retraining;transfer training   Clinical Decision Making Complexity (OT) low complexity   Therapy Frequency (OT) Daily   Predicted Duration of Therapy 1 day   Risk & Benefits of therapy have been explained patient   OT Discharge Planning    OT Discharge Recommendation (DC Rec) Home with assist   OT Rationale for DC Rec pt may need assist with IADL/ADL.   Therapy Certification   Start of Care Date 07/27/21   Certification date from 07/27/21   Certification date to 08/27/21   Medical Diagnosis R TKA   Total Evaluation Time (Minutes)   Total Evaluation Time (Minutes) 5

## 2021-07-27 NOTE — PROGRESS NOTES
[facility]      OUTPATIENT OCCUPATIONAL THERAPY  EVALUATION  PLAN OF TREATMENT FOR OUTPATIENT REHABILITATION  (COMPLETE FOR INITIAL CLAIMS ONLY)  Patient's Last Name, First Name, M.I.  YOB: 1960  Lara Streeter                          Provider's Name  [facility] Medical Record No.  [id number]                               Onset Date:  (P) 07/27/21   Start of Care Date:  (P) 07/27/21     Type:     ___PT   _X_OT   ___SLP Medical Diagnosis:  (P) R TKA                        OT Diagnosis:  (P) decreased ADL independence   Visits from SOC:  1   _________________________________________________________________________________  Plan of Treatment/Functional Goals    Planned Interventions: (P) ADL retraining, transfer training   Goals: See Occupational Therapy Goals on Care Plan in [facility] electronic health record.    Therapy Frequency: (P) Daily  Predicted Duration of Therapy Intervention: (P) 1 day  _________________________________________________________________________________    I CERTIFY THE NEED FOR THESE SERVICES FURNISHED UNDER        THIS PLAN OF TREATMENT AND WHILE UNDER MY CARE     (Physician co-signature of this document indicates review and certification of the therapy plan).                Certification date from: (P) 07/27/21, Certification date to: (P) 08/27/21    Referring Physician: (P) Vamshi Mcintosh MD            Initial Assessment        See Occupational Therapy evaluation dated (P) 07/27/21 in Epic electronic health record.

## 2021-07-27 NOTE — PLAN OF CARE
Occupational Therapy Discharge Summary    Reason for therapy discharge:    Discharged to home.    Progress towards therapy goal(s). See goals on Care Plan in [facility] electronic health record for goal details.  Goals met    Therapy recommendation(s):    No further therapy is recommended.    CORI Tapia, CLT, 7/27/2021

## 2021-07-27 NOTE — PLAN OF CARE
Physical Therapy Discharge Summary    Reason for therapy discharge:    Discharged to home with outpatient therapy.    Progress towards therapy goal(s). See goals on Care Plan in [facility] electronic health record for goal details.  Goals met    Therapy recommendation(s):    Continued therapy is recommended.  Rationale/Recommendations:  needs further PT for  ROM /strength post TKA .

## 2021-07-27 NOTE — DISCHARGE SUMMARY
ORTHOPEDIC DISCHARGE SUMMARY       Lara Streeter,  1960, MRN [id number]    Admission Date: 2021  Admission Diagnoses: Right knee pain [M25.561]  Primary osteoarthritis of right knee [M17.11]     Discharge Date:  21    Post-operative Day:  1 Day Post-Op    Reason for Admission: The patient was admitted for the following:  Procedure(s):  ARTHROPLASTY, RIGHT KNEE, TOTAL      BRIEF HOSPITAL COURSE   This 61 year old female underwent the aforementioned procedure with Dr. Romero on 21. There were no intraoperative complications and the patient was transferred to the recovery room and later the orthopedic unit in stable condition. Once the patient reached the orthopedic floor our orthopedic pain protocol was implemented along with the following:    Anticoagulation Medications: ASA  Therapy: PT and OT  Activity: WBAT  Bracing: None    Consultations during Admission: Hospitalist service for medical management     COMPLICATIONS/SIGNIFICANT FINDINGS    None    DISCHARGE INFORMATION   Condition at discharge: Good  Discharge destination: Home  Patient was seen by myself on the date of discharge.    FOLLOW UP CARE   Follow up with orthopedics in 2 weeks or sooner should the need arise. Ortho will continue to manage pain control, post op anticoagulation and incision care.     Follow up with your PCP in 6-10 days for management of chronic medical problems and to evaluate for post op medical complications including constipation, nausea/vomiting, DVT/PE, anemia, changes in blood pressure, fevers/chills, urinary retention and atelectasis/pneumonia.     Subjective   Patient is doing well today. Patient has passed HER therapies. SHE is using OXYCODONE for pain which SHE is tolerating well. SHE feels ready to discharge home. No other complaints. All questions answered.     Physical Exam   The patient is A&Ox3.  Sensation is intact.  Dorsiflexion and plantar flexion is intact.  Dorsalis pedis pulse  "intact.  Calves are soft and non-tender.   The incision is covered. Dressing C/D/I    BP (!) 147/65 (BP Location: Right arm)   Pulse 63   Temp 98  F (36.7  C) (Oral)   Resp 18   Ht 1.676 m (5' 6\")   Wt 113 kg (249 lb 3.2 oz)   SpO2 97%   BMI 40.22 kg/m      Pertinent Results at Discharge     Hemoglobin   Date/Time Value Ref Range Status   07/27/2021 07:25 AM 11.8 11.7 - 15.7 g/dL Final   07/26/2021 09:44 AM 13.6 11.7 - 15.7 g/dL Final   12/24/2019 04:47 PM 13.1 12.0 - 16.0 g/dL Final     INR   Date/Time Value Ref Range Status   12/26/2018 04:00 PM 1.01 0.90 - 1.10 Final   10/22/2018 10:22 AM 1.01 0.90 - 1.10 Final   10/14/2018 03:29 PM 1.03 0.90 - 1.10 Final     Platelet Count   Date/Time Value Ref Range Status   07/27/2021 07:25  150 - 450 10e3/uL Final   07/26/2021 09:44  150 - 450 10e3/uL Final   12/24/2019 04:47  140 - 440 thou/uL Final       Medications at Discharge      Lara Streeter   Home Medication Instructions NICHOLAS:[id number]    Printed on:07/27/21 1213   Medication Information                      acetaminophen (TYLENOL) 325 MG tablet  Take 3 tablets (975 mg) by mouth every 8 hours             amitriptyline (ELAVIL) 75 MG tablet  [AMITRIPTYLINE (ELAVIL) 75 MG TABLET] Take 1 tablet (75 mg total) by mouth at bedtime.             aspirin (ASA) 81 MG chewable tablet  Take 1 tablet (81 mg) by mouth 2 times daily             atenolol (TENORMIN) 25 MG tablet  [ATENOLOL (TENORMIN) 25 MG TABLET] Take 25 mg by mouth 2 (two) times a day.             cefadroxil (DURICEF) 500 MG capsule  Take 1 capsule (500 mg) by mouth 2 times daily             ferrous sulfate 325 (65 FE) MG tablet  [FERROUS SULFATE 325 (65 FE) MG TABLET] Take 1 tablet by mouth 2 (two) times a day with meals.             hydrOXYzine (VISTARIL) 25 MG capsule  Take 1 capsule (25 mg) by mouth 4 times daily             omeprazole (PRILOSEC) 40 MG capsule  [OMEPRAZOLE (PRILOSEC) 40 MG CAPSULE] Take 40 mg by mouth 2 (two) " times a day before meals.             oxyCODONE (ROXICODONE) 5 MG tablet  Take 1-2 tablets (5-10 mg) by mouth every 6 hours as needed for pain             polyethylene glycol (MIRALAX) 17 gram packet  [POLYETHYLENE GLYCOL (MIRALAX) 17 GRAM PACKET] Take 17 g by mouth daily as needed.             SENNA-docusate sodium (SENNA S) 8.6-50 MG tablet  Take 2 tablets by mouth 2 times daily                 Problem List   Active Problems:    Primary osteoarthritis of right knee    Status post total right knee replacement        Park Lazar PA-C, ESTEFANI  Date: 7/27/2021  Time: 12:13 PM

## 2021-07-27 NOTE — CONSULTS
Care Management Initial Consult    General Information  Assessment completed with: Patient,    Type of CM/SW Visit: Denies Needs at Discharge    Primary Care Provider verified and updated as needed: Yes   Readmission within the last 30 days: no previous admission in last 30 days      Reason for Consult: discharge planning  Advance Care Planning: Advance Care Planning Reviewed: verified with patient          Communication Assessment  Patient's communication style: spoken language (English or Bilingual)    Hearing Difficulty or Deaf: no   Wear Glasses or Blind: no    Cognitive  Cognitive/Neuro/Behavioral: WDL  Level of Consciousness: alert  Arousal Level: opens eyes spontaneously  Orientation: oriented x 4  Mood/Behavior: calm, cooperative     Speech: spontaneous, clear    Living Environment:   People in home: child(ashley), adult     Current living Arrangements: apartment      Able to return to prior arrangements: yes       Family/Social Support:  Care provided by: self  Provides care for: no one  Marital Status: Single  Children          Description of Support System: Supportive         Current Resources:   Patient receiving home care services:       Community Resources:    Equipment currently used at home: tub bench (standard toilet)  Supplies currently used at home:      Employment/Financial:  Employment Status: employed part-time        Financial Concerns: No concerns identified   Referral to Financial Counselor: No       Lifestyle & Psychosocial Needs:  Social Determinants of Health     Tobacco Use: Low Risk      Smoking Tobacco Use: Never Smoker     Smokeless Tobacco Use: Never Used   Alcohol Use:      Frequency of Alcohol Consumption:      Average Number of Drinks:      Frequency of Binge Drinking:    Financial Resource Strain:      Difficulty of Paying Living Expenses:    Food Insecurity:      Worried About Running Out of Food in the Last Year:      Ran Out of Food in the Last Year:    Transportation Needs:       Lack of Transportation (Medical):      Lack of Transportation (Non-Medical):    Physical Activity:      Days of Exercise per Week:      Minutes of Exercise per Session:    Stress:      Feeling of Stress :    Social Connections:      Frequency of Communication with Friends and Family:      Frequency of Social Gatherings with Friends and Family:      Attends Druze Services:      Active Member of Clubs or Organizations:      Attends Club or Organization Meetings:      Marital Status:    Intimate Partner Violence:      Fear of Current or Ex-Partner:      Emotionally Abused:      Physically Abused:      Sexually Abused:    Depression:      PHQ-2 Score:    Housing Stability:      Unable to Pay for Housing in the Last Year:      Number of Places Lived in the Last Year:      Unstable Housing in the Last Year:        Functional Status:  Prior to admission patient needed assistance:              Mental Health Status:          Chemical Dependency Status:                Values/Beliefs:  Spiritual, Cultural Beliefs, Druze Practices, Values that affect care: no             Care Management Discharge Note    Discharge Date: 07/27/2021       Discharge Disposition: Home    Discharge Services: None    Discharge DME: None    Discharge Transportation: family or friend will provide    Persons Notified of Discharge Plans: Patient   Patient/Family in Agreement with the Plan:  Yes    Additional Information:  The patient is from home, where she lives with her daughter, and she is independent with ADLs/IADLs at baseline. The patient denied having any discharge needs. Daughter to transport.    Felix Morgan RN

## 2021-10-07 ENCOUNTER — LAB REQUISITION (OUTPATIENT)
Dept: LAB | Facility: CLINIC | Age: 61
End: 2021-10-07

## 2021-10-07 DIAGNOSIS — R30.0 DYSURIA: ICD-10-CM

## 2021-10-07 LAB
ALBUMIN UR-MCNC: 50 MG/DL
APPEARANCE UR: CLEAR
BACTERIA #/AREA URNS HPF: ABNORMAL /HPF
BILIRUB UR QL STRIP: NEGATIVE
COLOR UR AUTO: YELLOW
GLUCOSE UR STRIP-MCNC: NEGATIVE MG/DL
HGB UR QL STRIP: ABNORMAL
HYALINE CASTS: 8 /LPF
KETONES UR STRIP-MCNC: NEGATIVE MG/DL
LEUKOCYTE ESTERASE UR QL STRIP: ABNORMAL
MUCOUS THREADS #/AREA URNS LPF: PRESENT /LPF
NITRATE UR QL: NEGATIVE
PH UR STRIP: 6.5 [PH] (ref 5–7)
RBC URINE: 15 /HPF
SP GR UR STRIP: 1.02 (ref 1–1.03)
SQUAMOUS EPITHELIAL: 1 /HPF
TRANSITIONAL EPI: 1 /HPF
UROBILINOGEN UR STRIP-MCNC: 2 MG/DL
WBC URINE: 26 /HPF

## 2021-10-07 PROCEDURE — 81001 URINALYSIS AUTO W/SCOPE: CPT | Performed by: FAMILY MEDICINE

## 2021-10-07 PROCEDURE — 87086 URINE CULTURE/COLONY COUNT: CPT | Performed by: FAMILY MEDICINE

## 2021-10-08 LAB — BACTERIA UR CULT: NO GROWTH

## 2022-03-25 ENCOUNTER — TRANSFERRED RECORDS (OUTPATIENT)
Dept: HEALTH INFORMATION MANAGEMENT | Facility: CLINIC | Age: 62
End: 2022-03-25
Payer: COMMERCIAL

## 2022-03-25 ENCOUNTER — LAB REQUISITION (OUTPATIENT)
Dept: LAB | Facility: CLINIC | Age: 62
End: 2022-03-25

## 2022-03-25 DIAGNOSIS — Z13.220 ENCOUNTER FOR SCREENING FOR LIPOID DISORDERS: ICD-10-CM

## 2022-03-25 DIAGNOSIS — I10 ESSENTIAL (PRIMARY) HYPERTENSION: ICD-10-CM

## 2022-03-25 LAB
ALBUMIN SERPL-MCNC: 3.5 G/DL (ref 3.5–5)
ALP SERPL-CCNC: 184 U/L (ref 45–120)
ALT SERPL W P-5'-P-CCNC: 55 U/L (ref 0–45)
ANION GAP SERPL CALCULATED.3IONS-SCNC: 10 MMOL/L (ref 5–18)
AST SERPL W P-5'-P-CCNC: 39 U/L (ref 0–40)
BILIRUB SERPL-MCNC: 0.4 MG/DL (ref 0–1)
BUN SERPL-MCNC: 10 MG/DL (ref 8–22)
CALCIUM SERPL-MCNC: 11.3 MG/DL (ref 8.5–10.5)
CHLORIDE BLD-SCNC: 105 MMOL/L (ref 98–107)
CHOLEST SERPL-MCNC: 217 MG/DL
CO2 SERPL-SCNC: 26 MMOL/L (ref 22–31)
CREAT SERPL-MCNC: 0.74 MG/DL (ref 0.6–1.1)
GFR SERPL CREATININE-BSD FRML MDRD: 90 ML/MIN/1.73M2
GLUCOSE BLD-MCNC: 110 MG/DL (ref 70–125)
HBA1C MFR BLD: 6.3 % (ref 4.2–6.1)
HDLC SERPL-MCNC: 36 MG/DL
LDLC SERPL CALC-MCNC: 137 MG/DL
POTASSIUM BLD-SCNC: 4.2 MMOL/L (ref 3.5–5)
PROT SERPL-MCNC: 7.5 G/DL (ref 6–8)
SODIUM SERPL-SCNC: 141 MMOL/L (ref 136–145)
TRIGL SERPL-MCNC: 219 MG/DL

## 2022-03-25 PROCEDURE — 80061 LIPID PANEL: CPT | Performed by: FAMILY MEDICINE

## 2022-03-25 PROCEDURE — 80053 COMPREHEN METABOLIC PANEL: CPT | Performed by: FAMILY MEDICINE

## 2022-04-04 ENCOUNTER — MEDICAL CORRESPONDENCE (OUTPATIENT)
Dept: HEALTH INFORMATION MANAGEMENT | Facility: CLINIC | Age: 62
End: 2022-04-04
Payer: COMMERCIAL

## 2022-05-10 ENCOUNTER — LAB REQUISITION (OUTPATIENT)
Dept: LAB | Facility: CLINIC | Age: 62
End: 2022-05-10
Payer: COMMERCIAL

## 2022-05-10 ENCOUNTER — LAB REQUISITION (OUTPATIENT)
Dept: LAB | Facility: CLINIC | Age: 62
End: 2022-05-10

## 2022-05-10 DIAGNOSIS — R05.9 COUGH, UNSPECIFIED: ICD-10-CM

## 2022-05-10 DIAGNOSIS — R35.0 FREQUENCY OF MICTURITION: ICD-10-CM

## 2022-05-10 PROCEDURE — U0005 INFEC AGEN DETEC AMPLI PROBE: HCPCS | Mod: ORL | Performed by: FAMILY MEDICINE

## 2022-05-10 PROCEDURE — 87086 URINE CULTURE/COLONY COUNT: CPT | Performed by: FAMILY MEDICINE

## 2022-05-12 LAB — SARS-COV-2 RNA RESP QL NAA+PROBE: NEGATIVE

## 2022-05-13 LAB — BACTERIA UR CULT: NORMAL

## 2022-06-08 ENCOUNTER — TRANSFERRED RECORDS (OUTPATIENT)
Dept: HEALTH INFORMATION MANAGEMENT | Facility: CLINIC | Age: 62
End: 2022-06-08

## 2022-06-14 ENCOUNTER — LAB REQUISITION (OUTPATIENT)
Dept: LAB | Facility: CLINIC | Age: 62
End: 2022-06-14

## 2022-06-14 ENCOUNTER — TRANSFERRED RECORDS (OUTPATIENT)
Dept: HEALTH INFORMATION MANAGEMENT | Facility: CLINIC | Age: 62
End: 2022-06-14

## 2022-06-14 DIAGNOSIS — R42 DIZZINESS AND GIDDINESS: ICD-10-CM

## 2022-06-14 LAB
ALBUMIN SERPL-MCNC: 3.4 G/DL (ref 3.5–5)
ALP SERPL-CCNC: 199 U/L (ref 45–120)
ALT SERPL W P-5'-P-CCNC: 55 U/L (ref 0–45)
ANION GAP SERPL CALCULATED.3IONS-SCNC: 8 MMOL/L (ref 5–18)
AST SERPL W P-5'-P-CCNC: 33 U/L (ref 0–40)
BILIRUB SERPL-MCNC: 0.5 MG/DL (ref 0–1)
BUN SERPL-MCNC: 14 MG/DL (ref 8–22)
CALCIUM SERPL-MCNC: 10.2 MG/DL (ref 8.5–10.5)
CHLORIDE BLD-SCNC: 103 MMOL/L (ref 98–107)
CO2 SERPL-SCNC: 26 MMOL/L (ref 22–31)
CREAT SERPL-MCNC: 0.74 MG/DL (ref 0.6–1.1)
GFR SERPL CREATININE-BSD FRML MDRD: 90 ML/MIN/1.73M2
GLUCOSE BLD-MCNC: 144 MG/DL (ref 70–125)
POTASSIUM BLD-SCNC: 4.5 MMOL/L (ref 3.5–5)
PROT SERPL-MCNC: 7 G/DL (ref 6–8)
SODIUM SERPL-SCNC: 137 MMOL/L (ref 136–145)

## 2022-06-14 PROCEDURE — 80053 COMPREHEN METABOLIC PANEL: CPT | Performed by: PHYSICIAN ASSISTANT

## 2022-09-16 ENCOUNTER — TRANSFERRED RECORDS (OUTPATIENT)
Dept: HEALTH INFORMATION MANAGEMENT | Facility: CLINIC | Age: 62
End: 2022-09-16

## 2022-11-08 ENCOUNTER — HOSPITAL ENCOUNTER (EMERGENCY)
Facility: HOSPITAL | Age: 62
Discharge: HOME | End: 2022-11-08
Attending: EMERGENCY MEDICINE | Admitting: EMERGENCY MEDICINE
Payer: COMMERCIAL

## 2022-11-08 ENCOUNTER — APPOINTMENT (OUTPATIENT)
Dept: RADIOLOGY | Facility: HOSPITAL | Age: 62
End: 2022-11-08
Attending: EMERGENCY MEDICINE
Payer: COMMERCIAL

## 2022-11-08 VITALS
HEIGHT: 66 IN | OXYGEN SATURATION: 97 % | TEMPERATURE: 97.7 F | WEIGHT: 240 LBS | BODY MASS INDEX: 38.57 KG/M2 | SYSTOLIC BLOOD PRESSURE: 149 MMHG | HEART RATE: 61 BPM | RESPIRATION RATE: 24 BRPM | DIASTOLIC BLOOD PRESSURE: 79 MMHG

## 2022-11-08 DIAGNOSIS — R03.0 ELEVATED BLOOD PRESSURE READING: ICD-10-CM

## 2022-11-08 DIAGNOSIS — R06.00 DYSPNEA, UNSPECIFIED TYPE: ICD-10-CM

## 2022-11-08 DIAGNOSIS — I49.3 PVC'S (PREMATURE VENTRICULAR CONTRACTIONS): ICD-10-CM

## 2022-11-08 DIAGNOSIS — R06.2 WHEEZING: ICD-10-CM

## 2022-11-08 LAB
ANION GAP SERPL CALCULATED.3IONS-SCNC: 8 MMOL/L (ref 7–15)
ATRIAL RATE - MUSE: 66 BPM
BUN SERPL-MCNC: 11.8 MG/DL (ref 8–23)
CALCIUM SERPL-MCNC: 9.8 MG/DL (ref 8.8–10.2)
CHLORIDE SERPL-SCNC: 104 MMOL/L (ref 98–107)
CREAT SERPL-MCNC: 0.67 MG/DL (ref 0.51–0.95)
DEPRECATED HCO3 PLAS-SCNC: 23 MMOL/L (ref 22–29)
DIASTOLIC BLOOD PRESSURE - MUSE: 65 MMHG
ERYTHROCYTE [DISTWIDTH] IN BLOOD BY AUTOMATED COUNT: 12.1 % (ref 10–15)
FLUAV RNA SPEC QL NAA+PROBE: NEGATIVE
FLUBV RNA RESP QL NAA+PROBE: NEGATIVE
GFR SERPL CREATININE-BSD FRML MDRD: >90 ML/MIN/1.73M2
GLUCOSE SERPL-MCNC: 122 MG/DL (ref 70–99)
HCT VFR BLD AUTO: 40.7 % (ref 35–47)
HGB BLD-MCNC: 13.6 G/DL (ref 11.7–15.7)
INTERPRETATION ECG - MUSE: NORMAL
MAGNESIUM SERPL-MCNC: 1.8 MG/DL (ref 1.7–2.3)
MCH RBC QN AUTO: 32.2 PG (ref 26.5–33)
MCHC RBC AUTO-ENTMCNC: 33.4 G/DL (ref 31.5–36.5)
MCV RBC AUTO: 96 FL (ref 78–100)
NT-PROBNP SERPL-MCNC: 61 PG/ML (ref 0–900)
P AXIS - MUSE: 27 DEGREES
PLATELET # BLD AUTO: 197 10E3/UL (ref 150–450)
POTASSIUM SERPL-SCNC: 4.2 MMOL/L (ref 3.4–5.3)
PR INTERVAL - MUSE: 150 MS
QRS DURATION - MUSE: 84 MS
QT - MUSE: 394 MS
QTC - MUSE: 413 MS
R AXIS - MUSE: 21 DEGREES
RBC # BLD AUTO: 4.22 10E6/UL (ref 3.8–5.2)
RSV RNA SPEC NAA+PROBE: NEGATIVE
SARS-COV-2 RNA RESP QL NAA+PROBE: NEGATIVE
SODIUM SERPL-SCNC: 135 MMOL/L (ref 136–145)
SYSTOLIC BLOOD PRESSURE - MUSE: 148 MMHG
T AXIS - MUSE: -45 DEGREES
TROPONIN T SERPL HS-MCNC: 6 NG/L
VENTRICULAR RATE- MUSE: 66 BPM
WBC # BLD AUTO: 5.2 10E3/UL (ref 4–11)

## 2022-11-08 PROCEDURE — 83880 ASSAY OF NATRIURETIC PEPTIDE: CPT | Performed by: EMERGENCY MEDICINE

## 2022-11-08 PROCEDURE — 84484 ASSAY OF TROPONIN QUANT: CPT | Performed by: EMERGENCY MEDICINE

## 2022-11-08 PROCEDURE — 82310 ASSAY OF CALCIUM: CPT | Performed by: EMERGENCY MEDICINE

## 2022-11-08 PROCEDURE — 93010 ELECTROCARDIOGRAM REPORT: CPT | Performed by: INTERNAL MEDICINE

## 2022-11-08 PROCEDURE — 94640 AIRWAY INHALATION TREATMENT: CPT

## 2022-11-08 PROCEDURE — 93005 ELECTROCARDIOGRAM TRACING: CPT | Performed by: EMERGENCY MEDICINE

## 2022-11-08 PROCEDURE — 99285 EMERGENCY DEPT VISIT HI MDM: CPT | Mod: CS,25

## 2022-11-08 PROCEDURE — 36415 COLL VENOUS BLD VENIPUNCTURE: CPT | Performed by: EMERGENCY MEDICINE

## 2022-11-08 PROCEDURE — 71045 X-RAY EXAM CHEST 1 VIEW: CPT

## 2022-11-08 PROCEDURE — C9803 HOPD COVID-19 SPEC COLLECT: HCPCS

## 2022-11-08 PROCEDURE — 83735 ASSAY OF MAGNESIUM: CPT | Performed by: EMERGENCY MEDICINE

## 2022-11-08 PROCEDURE — 250N000009 HC RX 250: Performed by: EMERGENCY MEDICINE

## 2022-11-08 PROCEDURE — 85027 COMPLETE CBC AUTOMATED: CPT | Performed by: EMERGENCY MEDICINE

## 2022-11-08 PROCEDURE — 87637 SARSCOV2&INF A&B&RSV AMP PRB: CPT | Performed by: EMERGENCY MEDICINE

## 2022-11-08 RX ORDER — METHYLPREDNISOLONE 4 MG
TABLET, DOSE PACK ORAL
Qty: 21 TABLET | Refills: 0 | Status: SHIPPED | OUTPATIENT
Start: 2022-11-08

## 2022-11-08 RX ORDER — IPRATROPIUM BROMIDE AND ALBUTEROL SULFATE 2.5; .5 MG/3ML; MG/3ML
3 SOLUTION RESPIRATORY (INHALATION) ONCE
Status: COMPLETED | OUTPATIENT
Start: 2022-11-08 | End: 2022-11-08

## 2022-11-08 RX ADMIN — IPRATROPIUM BROMIDE AND ALBUTEROL SULFATE 3 ML: 2.5; .5 SOLUTION RESPIRATORY (INHALATION) at 07:54

## 2022-11-08 ASSESSMENT — ENCOUNTER SYMPTOMS
FEVER: 0
CHEST TIGHTNESS: 0
EYE PAIN: 0
DIARRHEA: 0
NERVOUS/ANXIOUS: 1
LIGHT-HEADEDNESS: 1
COUGH: 1
NECK PAIN: 0
DIZZINESS: 0
WHEEZING: 1
GASTROINTESTINAL NEGATIVE: 1
FLANK PAIN: 0

## 2022-11-08 ASSESSMENT — ACTIVITIES OF DAILY LIVING (ADL)
ADLS_ACUITY_SCORE: 35
ADLS_ACUITY_SCORE: 35

## 2022-11-08 NOTE — ED NOTES
0800:  patient sitting upright in bed, exp wheeze noted on auscultation. IV lab, EKG, RT here and neb started. No resp distress noted.    0900: states breathing easier after neb. Watching TV. Awaiting CXR

## 2022-11-08 NOTE — ED PROVIDER NOTES
EMERGENCY DEPARTMENT ENCOUNTER      NAME: Lara Streeter  AGE: 62 year old female  YOB: 1960  MRN: [id number]  EVALUATION DATE & TIME: 2022  7:06 AM    PCP: Daphney Grewal    ED PROVIDER: Pierre Quintanilla MD        Chief Complaint   Patient presents with     Breathing Problem     Anxiety         FINAL IMPRESSION:  1. Dyspnea, unspecified type    2. Elevated blood pressure reading    3. PVC's (premature ventricular contractions)    4. Wheezing          ED COURSE & MEDICAL DECISION MAKIN:12 AM Met with patient for initial interview and exam. Discussed initial plan for care for their stay in the emergency department.  PPE: Provider wore gloves, N95 mask, eye protection.  11:13 AM Rechecked patient. Updated them on results. Discussed plans for discharge and follow up. Patient is in agreement.       Pertinent Labs & Imaging studies reviewed. (See chart for details)  62 year old female presents to the Emergency Department for evaluation of shortness of breath and concern for elevated blood pressure at work    ED Course as of 22 1123      0719 History of proximal A. fib, obesity, on antihypertensives who is prescribed inhaler by primary care doctor 2 months ago who presents with lightheadedness and concern for elevated blood pressure developed this morning at 6 AM   0721 Reports that she is got mold in the room she been living in for the last month but was prescribed an inhaler prior to living in the room   0722 States she is not a smoker.  Denies any history of heart disease   0722 Denies any chest pain or palpitations   0722 On exam she has some wheezing differential includes bronchitis, asthma, pneumonia, heart failure   0722 Plan for EKG, chest x-ray, cardiac labs   0723 Pulmonary emboli unlikely.   0723 With wheezing on examination I feel anxiety is less likely the cause of patient's symptoms   0723 Patient is on 2 medicines for blood pressure unfortunately I  cannot see her records from her primary care clinic and patient is not sure of the names of both medications   1103 ACS unlikely   1103 Troponin T, High Sensitivity: 6   1103 N-Terminal Pro BNP Inpatient: 61  CHF unlikely   1104 X-ray negative   1104 Hemoglobin: 13.6   1104 Influenza A: Negative   1104 SARS CoV2 PCR: Negative   1122 Given duo nebs for wheezing and patient symptoms have improved.  Discussed need to not stay in a room with mold and get the mold cleaned out.  Will send home with Medrol Dosepak.  Patient has albuterol at home.  Recommend follow-up primary care doctor   1123 Discussed need for pulmonary function tests with primary care doctor.  We will put referral in for rapid access heart clinic   1123 ACS unlikely         Medical Decision Making    Supplemental history from: N/A    External Record(s) Reviewed: N/A    Differential Diagnosis: See MDM charting for differential considered.     I performed an independent interpretation of the: EKG: See my documentation.    Discussed with radiology regarding test interpretation: N/A    Discussion of management with another provider: See ED Course    The following testing was considered but ultimately not selected: None    I considered prescription management with: Symptomatic Management    The patient's care impacted: Hypertension    Consideration of Admission/Observation: N/A - Patient discharged without consideration for admission    Care significantly affected by Social Determinants of Health including: N/A          At the conclusion of the encounter I discussed the results of all of the tests and the disposition. The questions were answered. The patient or family acknowledged understanding and was agreeable with the care plan.         MEDICATIONS GIVEN IN THE EMERGENCY:  Medications   ipratropium - albuterol 0.5 mg/2.5 mg/3 mL (DUONEB) neb solution 3 mL (3 mLs Nebulization Given 11/8/22 0754)       NEW PRESCRIPTIONS STARTED AT TODAY'S ER VISIT  New  "Prescriptions    METHYLPREDNISOLONE (MEDROL DOSEPAK) 4 MG TABLET THERAPY PACK    Follow Package Directions          =================================================================    HPI    Triage note  \"    Patient reports that she went to the work this morning and felt like her blood pressure was high so she came to the ER to get her blood pressure checked. She also reports intermittent SOB and some anxiety.    Triage Assessment       Row Name 11/08/22 0655       Respiratory WDL    Respiratory WDL X  SOB       Cardiac WDL    Cardiac WDL WDL                  \"      Patient information was obtained from: Patient    Use of : N/A         Lara Streeter is a 62 year old female with a pertinent history of cervical cancer, PAF not on blood thinners, GERD, HTN, anxiety who presents to this ED via private vehicle for evaluation of shortness of breath.     Per patient they note that around 0600 this morning while at work they felt as if their blood pressure was elevated. They report having taken deep breaths and attempting to calm down but the feeling still persisting and constant in nature. She also reports associated lightheadedness. Patient believes it may be anxiety noting some issues with her daughter regarding their fridge believing she broke it despite it being 1 year old. She is on two blood pressure medications, one being atenolol, and follows with [facility] for her care.     Patient also notes having been living in her room which has had mold for the past month. She notes a few weeks after this she has developed a non productive cough with wheezing for which she has seen her doctor. She notes having been given albuterol by her doctor but states the wheezing has progressively worsened.     She denies any known heart disease or blood clots and does not smoke. Patient denies any chest pain or tightness, dizziness, diarrhea, changes to urine or bowel movements as well as any other complaints at the " time.       REVIEW OF SYSTEMS   Review of Systems   Constitutional: Negative for fever.   HENT: Negative for congestion.    Eyes: Negative for pain.   Respiratory: Positive for cough (non productive) and wheezing (progressively worsening). Negative for chest tightness.    Cardiovascular: Negative for chest pain.   Gastrointestinal: Negative.  Negative for diarrhea.   Genitourinary: Negative for flank pain.   Musculoskeletal: Negative for neck pain.   Neurological: Positive for light-headedness. Negative for dizziness.   Psychiatric/Behavioral: The patient is nervous/anxious (feeling of high blood pressure).         PAST MEDICAL HISTORY:  Past Medical History:   Diagnosis Date     Anemia      Anxiety      Cervical cancer (H)      Depression      Diverticulitis      Gastroparesis      GERD (gastroesophageal reflux disease)      Hypertension      IBS (irritable bowel syndrome)      Morbid obesity (H)      Paroxysmal atrial fibrillation (H)        PAST SURGICAL HISTORY:  Past Surgical History:   Procedure Laterality Date     APPENDECTOMY       ARTHROPLASTY KNEE Right 7/26/2021    Procedure: ARTHROPLASTY, RIGHT KNEE, TOTAL;  Surgeon: Vamshi Romero MD;  Location[facility] OR     CHOLECYSTECTOMY       ESOPHAGOSCOPY, GASTROSCOPY, DUODENOSCOPY (EGD), COMBINED N/A 10/18/2017    Procedure: ESOPHAGOGASTRODUODENOSCOPY (EGD) with biopsy;  Surgeon: Salima Gauthier MD;  Location: [facility];  Service:      SIGMOIDOSCOPY FLEXIBLE N/A 9/18/2018    Procedure: SIGMOIDOSCOPY with cautery using APC;  Surgeon: Ambrosio Tabor MD;  Location: [facility];  Service:            CURRENT MEDICATIONS:    methylPREDNISolone (MEDROL DOSEPAK) 4 MG tablet therapy pack  acetaminophen (TYLENOL) 325 MG tablet  amitriptyline (ELAVIL) 75 MG tablet  aspirin (ASA) 81 MG chewable tablet  atenolol (TENORMIN) 25 MG tablet  cefadroxil (DURICEF) 500 MG capsule  ferrous sulfate 325 (65 FE) MG tablet  hydrOXYzine (VISTARIL) 25 MG  "capsule  omeprazole (PRILOSEC) 40 MG capsule  oxyCODONE (ROXICODONE) 5 MG tablet  polyethylene glycol (MIRALAX) 17 gram packet  SENNA-docusate sodium (SENNA S) 8.6-50 MG tablet        ALLERGIES:  Allergies   Allergen Reactions     Flagyl [Metronidazole] Nausea and Vomiting     Lisinopril Nausea and Vomiting     Opioids - Morphine Analogues [Morphine] Nausea and Vomiting     Phenergan [Promethazine] Nausea and Vomiting       FAMILY HISTORY:  Family History   Problem Relation Age of Onset     Diabetes Mother      Heart Disease Mother      Diabetes Father      Diabetes Sister      Breast Cancer No family hx of        SOCIAL HISTORY:   Social History     Socioeconomic History     Marital status: Single   Tobacco Use     Smoking status: Never     Smokeless tobacco: Never   Substance and Sexual Activity     Alcohol use: No     Drug use: No     Sexual activity: Yes     Birth control/protection: Post-menopausal   Social History Narrative    Lives with daughter and grandchildren       VITALS:  BP (!) 149/79   Pulse 61   Temp 97.7  F (36.5  C) (Temporal)   Resp 24   Ht 1.676 m (5' 6\")   Wt 108.9 kg (240 lb)   SpO2 97%   BMI 38.74 kg/m      PHYSICAL EXAM      Vitals: BP (!) 149/79   Pulse 61   Temp 97.7  F (36.5  C) (Temporal)   Resp 24   Ht 1.676 m (5' 6\")   Wt 108.9 kg (240 lb)   SpO2 97%   BMI 38.74 kg/m    General: Appears in no acute distress, awake, alert, interactive. Morbidly obese  Eyes: Conjunctivae non-injected. Sclera anicteric.  HENT: Atraumatic.  Neck: Supple.  Respiratory/Chest: Respiration unlabored. Faint wheezes and prolonged exhalation.   Heart: Regular rate and rhythm.   Abdomen: non distended  Musculoskeletal: Normal extremities. No edema or erythema.  Skin: Normal color. No rash or diaphoresis. Skin tags to chest  Neurologic: Face symmetric, moves all extremities spontaneously. Speech clear.  Psychiatric: Oriented to person, place, and time. Affect appropriate.       LAB:  All pertinent labs " reviewed and interpreted.  Results for orders placed or performed during the hospital encounter of 11/08/22   XR Chest Port 1 View    Impression    IMPRESSION: Negative chest.   CBC (+ platelets, no diff)   Result Value Ref Range    WBC Count 5.2 4.0 - 11.0 10e3/uL    RBC Count 4.22 3.80 - 5.20 10e6/uL    Hemoglobin 13.6 11.7 - 15.7 g/dL    Hematocrit 40.7 35.0 - 47.0 %    MCV 96 78 - 100 fL    MCH 32.2 26.5 - 33.0 pg    MCHC 33.4 31.5 - 36.5 g/dL    RDW 12.1 10.0 - 15.0 %    Platelet Count 197 150 - 450 10e3/uL   Troponin T, High Sensitivity (now)   Result Value Ref Range    Troponin T, High Sensitivity 6 <=14 ng/L   Basic metabolic panel   Result Value Ref Range    Sodium 135 (L) 136 - 145 mmol/L    Potassium 4.2 3.4 - 5.3 mmol/L    Chloride 104 98 - 107 mmol/L    Carbon Dioxide (CO2) 23 22 - 29 mmol/L    Anion Gap 8 7 - 15 mmol/L    Urea Nitrogen 11.8 8.0 - 23.0 mg/dL    Creatinine 0.67 0.51 - 0.95 mg/dL    Calcium 9.8 8.8 - 10.2 mg/dL    Glucose 122 (H) 70 - 99 mg/dL    GFR Estimate >90 >60 mL/min/1.73m2   Symptomatic; Unknown Influenza A/B & SARS-CoV2 (COVID-19) Virus PCR Multiplex Nasopharyngeal    Specimen: Nasopharyngeal; Swab   Result Value Ref Range    Influenza A PCR Negative Negative    Influenza B PCR Negative Negative    RSV PCR Negative Negative    SARS CoV2 PCR Negative Negative   Result Value Ref Range    Magnesium 1.8 1.7 - 2.3 mg/dL   Nt probnp inpatient   Result Value Ref Range    N terminal Pro BNP Inpatient 61 0 - 900 pg/mL   ECG 12-LEAD WITH MUSE (L[facility])   Result Value Ref Range    Systolic Blood Pressure 148 mmHg    Diastolic Blood Pressure 65 mmHg    Ventricular Rate 66 BPM    Atrial Rate 66 BPM    NM Interval 150 ms    QRS Duration 84 ms     ms    QTc 413 ms    P Axis 27 degrees    R AXIS 21 degrees    T Axis -45 degrees    Interpretation ECG       Sinus rhythm with frequent Premature ventricular complexes  Nonspecific T wave abnormality  Abnormal ECG  When compared with ECG of  24-DEC-2019 16:09,  Premature ventricular complexes are now Present  Confirmed by Miquel Dawn ([id number]) on 11/8/2022 8:42:53 AM         RADIOLOGY:  Reviewed all pertinent imaging. Please see official radiology report.  XR Chest Port 1 View   Final Result   IMPRESSION: Negative chest.          EKG:    Performed at: 08-NOV-2022 07:45    Impression: Sinus rhythm with frequent premature ventricular complexes.    Rate: 66 bpm  Rhythm: sinus  Axis: 21  [place] Interval: 150 ms  QRS Interval: 84 ms  QTc Interval: 413 ms  ST Changes: Nonspecific T wave abnormality   Comparison: When compared with EKG of 24-DEC-2019 16:09, premature ventricular complexes are now present.     I have independently reviewed and interpreted the EKG(s) documented above.    PROCEDURES:   None      I, Elodia Christensen, am serving as a scribe to document services personally performed by Felice Quintanilla MD based on my observation and the provider's statements to me. I, Dr. Felice Quintanilla, attest that Elodia Christensen is acting in a scribe capacity, has observed my performance of the services and has documented them in accordance with my direction.    Felice Quintanilla MD  Emergency Medicine  [facility] EMERGENCY DEPARTMENT  [address] [place] [address] [id number] [phone]     Felice Quintanilla MD  11/08/22 1128

## 2022-11-08 NOTE — PROGRESS NOTES
RESPIRATORY CARE NOTE     Patient Name: Lara Streeter  Today's Date: 11/8/2022       Pt to receive DUO NEB. BS are dem and clear. Pt is on RA, SpO2 is 100%. Post treatment there is no change.  RT will continue to monitor and assess.

## 2022-11-08 NOTE — DISCHARGE INSTRUCTIONS
Follow-up with your primary care doctor for recheck and consideration of stressing test.  You could also follow-up rapid access heart clinic.  Recommend avoid mold exposure and recommend Medrol Dosepak and use albuterol as needed

## 2022-11-08 NOTE — ED TRIAGE NOTES
Patient reports that she went to the work this morning and felt like her blood pressure was high so she came to the ER to get her blood pressure checked. She also reports intermittent SOB and some anxiety.    Triage Assessment       Row Name 11/08/22 0655       Respiratory WDL    Respiratory WDL X  SOB       Cardiac WDL    Cardiac WDL WDL

## 2022-11-15 ENCOUNTER — OFFICE VISIT (OUTPATIENT)
Dept: CARDIOLOGY | Facility: CLINIC | Age: 62
End: 2022-11-15
Attending: EMERGENCY MEDICINE
Payer: COMMERCIAL

## 2022-11-15 VITALS
RESPIRATION RATE: 16 BRPM | BODY MASS INDEX: 39.53 KG/M2 | HEART RATE: 63 BPM | HEIGHT: 66 IN | DIASTOLIC BLOOD PRESSURE: 74 MMHG | WEIGHT: 246 LBS | SYSTOLIC BLOOD PRESSURE: 140 MMHG

## 2022-11-15 DIAGNOSIS — I10 BENIGN ESSENTIAL HYPERTENSION: ICD-10-CM

## 2022-11-15 DIAGNOSIS — I48.0 PAF (PAROXYSMAL ATRIAL FIBRILLATION) (H): ICD-10-CM

## 2022-11-15 DIAGNOSIS — E66.01 MORBID OBESITY (H): ICD-10-CM

## 2022-11-15 DIAGNOSIS — I49.3 PVC'S (PREMATURE VENTRICULAR CONTRACTIONS): Primary | ICD-10-CM

## 2022-11-15 PROCEDURE — 99204 OFFICE O/P NEW MOD 45 MIN: CPT | Performed by: INTERNAL MEDICINE

## 2022-11-15 RX ORDER — ALBUTEROL SULFATE 90 UG/1
AEROSOL, METERED RESPIRATORY (INHALATION)
COMMUNITY
Start: 2022-09-17

## 2022-11-15 RX ORDER — AMLODIPINE BESYLATE 5 MG/1
5 TABLET ORAL DAILY
COMMUNITY
Start: 2022-10-03

## 2022-11-15 RX ORDER — IBUPROFEN 800 MG/1
TABLET, FILM COATED ORAL
COMMUNITY
Start: 2022-09-18

## 2022-11-15 NOTE — PATIENT INSTRUCTIONS
Continue current medications  Set up echocardiogram to look at heart structurally. We will contact you with results.

## 2022-11-15 NOTE — LETTER
11/15/2022    Daphney Grewal MD  [address] [facility]  [facility] [address]    RE: Lara Streeter       Dear Colleague,     I had the pleasure of seeing Lara Streeter in the [facility].      Thank you, Dr. Boris Quintanilla, for asking the [facility] team to see Ms. Lara Streeter in the rapid access clinic to evaluate frequent PVCs.    Assessment/Recommendations   Assessment:    1.  Frequent PVCs, etiology unclear as patient without any ischemic changes on ECG and electrolytes within normal limits.  Does have a history of essential hypertension with elevated blood pressures on presentation which may have been contributing along with her upper respiratory symptoms.  At this point, she has had no symptoms of recurrence and nothing on examination today.  Recommended an echocardiogram to reassess LV function, wall motion and valve function as an echo 7 years ago did suggest mild aortic insufficiency.  2.  Wheezing, likely due to pulmonary disease.  Unfortunately she did not take the steroid pack that you prescribed at discharge.  Encouraged her to follow-up with primary care as she continues to have audible wheezing on exam despite cleaning the mold out of her bedroom.  3.  Essential hypertension, borderline controlled.  Blood pressure slightly elevated today.  Again we will have her follow-up with primary care for reassessment.    Plan:  1.  Continue current medications  2.  Schedule echocardiogram with further recommendations to follow  3.  Encourage patient to follow-up with primary care regarding ongoing wheezing as well as to follow-up on blood pressures       History of Present Illness    Ms. Lara Streeter is a 62 year old female with history of essential hypertension and paroxysmal atrial fibrillation during hospitalization in 2015 but no history of diabetes, tobacco use, family history of premature coronary artery disease who recently  "presented to the ED for evaluation of shortness of breath and wheezing.  Was noted to have markedly elevated blood pressure at the time of her presentation along with ectopy.  Subsequent ECG demonstrated sinus rhythm with frequent PVCs but no evidence of ischemia.  Troponin level and BNP were within normal limits.  Chest x-ray unremarkable.  Was prescribed albuterol inhaler along with steroid Dosepak for treatment of bronchitis.  Since discharge from the ED, she has continued to use the inhaler but did not take steroids due to concern of long-term complications.  Has continued to wheeze.  Did get the mold cleaning out of her home.  Denies orthopnea, PND or lower extremity edema.  No history of exertional chest discomfort.  No sense of palpitations or heart racing.  Does again have a history of paroxysmal atrial fibrillation which occurred during hospitalization for acute diverticulitis in 2015.  No subsequent recurrence.    ECG (personally reviewed): ECG from the ED demonstrates sinus rhythm with frequent PVCs, nonspecific T wave abnormality.    Cardiac Imaging Studies (personally reviewed): No recent cardiac imaging     Physical Examination Review of Systems   BP (!) 140/74 (BP Location: Left arm, Patient Position: Sitting, Cuff Size: Adult Regular)   Pulse 63   Resp 16   Ht 1.676 m (5' 6\")   Wt 111.6 kg (246 lb)   BMI 39.71 kg/m    Body mass index is 39.71 kg/m .  Wt Readings from Last 3 Encounters:   11/15/22 111.6 kg (246 lb)   11/08/22 108.9 kg (240 lb)   07/26/21 113 kg (249 lb 3.2 oz)     General Appearance:   Awake, Alert, No acute distress.   HEENT:  No scleral icterus; the mucous membranes were pink and moist.   Neck: No cervical bruits or jugular venous distention    Chest: The spine was straight. The chest was symmetric.   Lungs:   Respirations unlabored; the lungs are clear to auscultation. No wheezing   Cardiovascular:    Regular rate and rhythm.  S1, S2 normal.  No murmur or gallop   Abdomen:  No " "organomegaly, masses, bruits, or tenderness. Bowels sounds are present   Extremities:  No peripheral edema, clubbing or cyanosis.   Skin: No xanthelasma. Warm, Dry.   Musculoskeletal: No tenderness.   Neurologic: Mood and affect are appropriate.    Enc Vitals  BP: (!) 140/74  Pulse: 63  Resp: 16  Weight: 111.6 kg (246 lb)  Height: 167.6 cm (5' 6\")                                         Medical History  Surgical History Family History Social History   Past Medical History:   Diagnosis Date     Anemia      Anxiety      Cervical cancer (H)      Depression      Diverticulitis      Gastroparesis      GERD (gastroesophageal reflux disease)      Hypertension      IBS (irritable bowel syndrome)      Morbid obesity (H)      Paroxysmal atrial fibrillation (H)     Past Surgical History:   Procedure Laterality Date     APPENDECTOMY       ARTHROPLASTY KNEE Right 7/26/2021    Procedure: ARTHROPLASTY, RIGHT KNEE, TOTAL;  Surgeon: Vamshi Romero MD;  Location: [facility] Main OR     CHOLECYSTECTOMY       ESOPHAGOSCOPY, GASTROSCOPY, DUODENOSCOPY (EGD), COMBINED N/A 10/18/2017    Procedure: ESOPHAGOGASTRODUODENOSCOPY (EGD) with biopsy;  Surgeon: Salima Gauthier MD;  Location: [facility];  Service:      SIGMOIDOSCOPY FLEXIBLE N/A 9/18/2018    Procedure: SIGMOIDOSCOPY with cautery using APC;  Surgeon: Ambrosio Tabor MD;  Location: [facility];  Service:     Family History   Problem Relation Age of Onset     Diabetes Mother      Heart Disease Mother      Diabetes Father      Diabetes Sister      Breast Cancer No family hx of     Social History     Socioeconomic History     Marital status: Single     Spouse name: Not on file     Number of children: Not on file     Years of education: Not on file     Highest education level: Not on file   Occupational History     Not on file   Tobacco Use     Smoking status: Never     Smokeless tobacco: Never   Substance and Sexual Activity     Alcohol use: No     Drug use: No     Sexual " activity: Yes     Birth control/protection: Post-menopausal   Other Topics Concern     Parent/sibling w/ CABG, MI or angioplasty before 65F 55M? No   Social History Narrative    Lives with daughter and grandchildren     Social Determinants of Health     Financial Resource Strain: Not on file   Food Insecurity: Not on file   Transportation Needs: Not on file   Physical Activity: Not on file   Stress: Not on file   Social Connections: Not on file   Intimate Partner Violence: Not on file   Housing Stability: Not on file          Medications  Allergies   Current Outpatient Medications   Medication Sig Dispense Refill     amitriptyline (ELAVIL) 75 MG tablet [AMITRIPTYLINE (ELAVIL) 75 MG TABLET] Take 1 tablet (75 mg total) by mouth at bedtime. 90 tablet 0     amLODIPine (NORVASC) 5 MG tablet Take 5 mg by mouth daily       atenolol (TENORMIN) 25 MG tablet [ATENOLOL (TENORMIN) 25 MG TABLET] Take 25 mg by mouth 2 (two) times a day.  2     cefadroxil (DURICEF) 500 MG capsule Take 1 capsule (500 mg) by mouth 2 times daily 14 capsule 0     hydrOXYzine (VISTARIL) 25 MG capsule Take 1 capsule (25 mg) by mouth 4 times daily 60 capsule 0     ibuprofen (ADVIL/MOTRIN) 800 MG tablet TAKE 1 TABLET BY MOUTH EVERY 8 HOURS WITH FOOD OR MILK AS NEEDED       omeprazole (PRILOSEC) 40 MG capsule [OMEPRAZOLE (PRILOSEC) 40 MG CAPSULE] Take 40 mg by mouth 2 (two) times a day before meals.       polyethylene glycol (MIRALAX) 17 gram packet [POLYETHYLENE GLYCOL (MIRALAX) 17 GRAM PACKET] Take 17 g by mouth daily as needed.       SENNA-docusate sodium (SENNA S) 8.6-50 MG tablet Take 2 tablets by mouth 2 times daily 50 tablet 0     VENTOLIN  (90 Base) MCG/ACT inhaler INHALE 1 PUFF BY MOUTH EVERY 4 HOURS AS NEEDED       acetaminophen (TYLENOL) 325 MG tablet Take 3 tablets (975 mg) by mouth every 8 hours (Patient not taking: Reported on 11/15/2022) 90 tablet 0     aspirin (ASA) 81 MG chewable tablet Take 1 tablet (81 mg) by mouth 2 times daily  (Patient not taking: Reported on 11/15/2022) 60 tablet 0     ferrous sulfate 325 (65 FE) MG tablet [FERROUS SULFATE 325 (65 FE) MG TABLET] Take 1 tablet by mouth 2 (two) times a day with meals. (Patient not taking: Reported on 11/15/2022)       methylPREDNISolone (MEDROL DOSEPAK) 4 MG tablet therapy pack Follow Package Directions (Patient not taking: Reported on 11/15/2022) 21 tablet 0     oxyCODONE (ROXICODONE) 5 MG tablet Take 1-2 tablets (5-10 mg) by mouth every 6 hours as needed for pain (Patient not taking: Reported on 11/15/2022) 50 tablet 0      Allergies   Allergen Reactions     Augmentin Nausea and Vomiting     Flagyl [Metronidazole] Nausea and Vomiting     Lisinopril Nausea and Vomiting     Morphine Nausea and Vomiting     No Clinical Screening - See Comments Nausea and Vomiting     Phenergan [Promethazine] Nausea and Vomiting         Lab Results    Chemistry/lipid CBC Cardiac Enzymes/BNP/TSH/INR   Recent Labs   Lab Test 11/08/22  0805 06/14/22  1234 03/25/22  1638   TRIG  --   --  219*   LDL  --   --  137*   BUN 11.8   < > 10   *   < > 141   CO2 23   < > 26    < > = values in this interval not displayed.    Recent Labs   Lab Test 11/08/22  0805   WBC 5.2   HGB 13.6   HCT 40.7   MCV 96       Recent Labs   Lab Test 12/24/19  1647 01/09/19  1620 12/26/18  1600 12/16/18  1429   TROPONINI <0.01   < > <0.01  --    BNP 93  --   --   --    TSH  --   --   --  0.90   INR  --   --  1.01  --     < > = values in this interval not displayed.        A total of 40 minutes was spent reviewing patient's medical records, obtaining history and performing examination, as well as discussing diagnoses/ recommendations with patient and answering all questions.                    Thank you for allowing me to participate in the care of your patient.      Sincerely,     Sinai Pate MD     [facility]  cc:   Felice Quintanilla MD  [address] [place] [address]  [id number]

## 2022-11-15 NOTE — PROGRESS NOTES
Thank you, Dr. Boris Quintanilla, for asking the [facility] team to see Ms. Lara Streeter in the rapid access clinic to evaluate frequent PVCs.    Assessment/Recommendations   Assessment:    1.  Frequent PVCs, etiology unclear as patient without any ischemic changes on ECG and electrolytes within normal limits.  Does have a history of essential hypertension with elevated blood pressures on presentation which may have been contributing along with her upper respiratory symptoms.  At this point, she has had no symptoms of recurrence and nothing on examination today.  Recommended an echocardiogram to reassess LV function, wall motion and valve function as an echo 7 years ago did suggest mild aortic insufficiency.  2.  Wheezing, likely due to pulmonary disease.  Unfortunately she did not take the steroid pack that you prescribed at discharge.  Encouraged her to follow-up with primary care as she continues to have audible wheezing on exam despite cleaning the mold out of her bedroom.  3.  Essential hypertension, borderline controlled.  Blood pressure slightly elevated today.  Again we will have her follow-up with primary care for reassessment.    Plan:  1.  Continue current medications  2.  Schedule echocardiogram with further recommendations to follow  3.  Encourage patient to follow-up with primary care regarding ongoing wheezing as well as to follow-up on blood pressures       History of Present Illness    Ms. Lara Streeter is a 62 year old female with history of essential hypertension and paroxysmal atrial fibrillation during hospitalization in 2015 but no history of diabetes, tobacco use, family history of premature coronary artery disease who recently presented to the ED for evaluation of shortness of breath and wheezing.  Was noted to have markedly elevated blood pressure at the time of her presentation along with ectopy.  Subsequent ECG demonstrated sinus rhythm with frequent PVCs  "but no evidence of ischemia.  Troponin level and BNP were within normal limits.  Chest x-ray unremarkable.  Was prescribed albuterol inhaler along with steroid Dosepak for treatment of bronchitis.  Since discharge from the ED, she has continued to use the inhaler but did not take steroids due to concern of long-term complications.  Has continued to wheeze.  Did get the mold cleaning out of her home.  Denies orthopnea, PND or lower extremity edema.  No history of exertional chest discomfort.  No sense of palpitations or heart racing.  Does again have a history of paroxysmal atrial fibrillation which occurred during hospitalization for acute diverticulitis in 2015.  No subsequent recurrence.    ECG (personally reviewed): ECG from the ED demonstrates sinus rhythm with frequent PVCs, nonspecific T wave abnormality.    Cardiac Imaging Studies (personally reviewed): No recent cardiac imaging     Physical Examination Review of Systems   BP (!) 140/74 (BP Location: Left arm, Patient Position: Sitting, Cuff Size: Adult Regular)   Pulse 63   Resp 16   Ht 1.676 m (5' 6\")   Wt 111.6 kg (246 lb)   BMI 39.71 kg/m    Body mass index is 39.71 kg/m .  Wt Readings from Last 3 Encounters:   11/15/22 111.6 kg (246 lb)   11/08/22 108.9 kg (240 lb)   07/26/21 113 kg (249 lb 3.2 oz)     General Appearance:   Awake, Alert, No acute distress.   HEENT:  No scleral icterus; the mucous membranes were pink and moist.   Neck: No cervical bruits or jugular venous distention    Chest: The spine was straight. The chest was symmetric.   Lungs:   Respirations unlabored; the lungs are clear to auscultation. No wheezing   Cardiovascular:    Regular rate and rhythm.  S1, S2 normal.  No murmur or gallop   Abdomen:  No organomegaly, masses, bruits, or tenderness. Bowels sounds are present   Extremities:  No peripheral edema, clubbing or cyanosis.   Skin: No xanthelasma. Warm, Dry.   Musculoskeletal: No tenderness.   Neurologic: Mood and affect are " "appropriate.    Enc Vitals  BP: (!) 140/74  Pulse: 63  Resp: 16  Weight: 111.6 kg (246 lb)  Height: 167.6 cm (5' 6\")                                         Medical History  Surgical History Family History Social History   Past Medical History:   Diagnosis Date     Anemia      Anxiety      Cervical cancer (H)      Depression      Diverticulitis      Gastroparesis      GERD (gastroesophageal reflux disease)      Hypertension      IBS (irritable bowel syndrome)      Morbid obesity (H)      Paroxysmal atrial fibrillation (H)     Past Surgical History:   Procedure Laterality Date     APPENDECTOMY       ARTHROPLASTY KNEE Right 7/26/2021    Procedure: ARTHROPLASTY, RIGHT KNEE, TOTAL;  Surgeon: Vamshi Romero MD;  Location: [facility] Main OR     CHOLECYSTECTOMY       ESOPHAGOSCOPY, GASTROSCOPY, DUODENOSCOPY (EGD), COMBINED N/A 10/18/2017    Procedure: ESOPHAGOGASTRODUODENOSCOPY (EGD) with biopsy;  Surgeon: Salima Gauthier MD;  Location: [facility];  Service:      SIGMOIDOSCOPY FLEXIBLE N/A 9/18/2018    Procedure: SIGMOIDOSCOPY with cautery using APC;  Surgeon: Ambrosio Tabor MD;  Location: [facility];  Service:     Family History   Problem Relation Age of Onset     Diabetes Mother      Heart Disease Mother      Diabetes Father      Diabetes Sister      Breast Cancer No family hx of     Social History     Socioeconomic History     Marital status: Single     Spouse name: Not on file     Number of children: Not on file     Years of education: Not on file     Highest education level: Not on file   Occupational History     Not on file   Tobacco Use     Smoking status: Never     Smokeless tobacco: Never   Substance and Sexual Activity     Alcohol use: No     Drug use: No     Sexual activity: Yes     Birth control/protection: Post-menopausal   Other Topics Concern     Parent/sibling w/ CABG, MI or angioplasty before 65F 55M? No   Social History Narrative    Lives with daughter and grandchildren     Social Determinants " of Health     Financial Resource Strain: Not on file   Food Insecurity: Not on file   Transportation Needs: Not on file   Physical Activity: Not on file   Stress: Not on file   Social Connections: Not on file   Intimate Partner Violence: Not on file   Housing Stability: Not on file          Medications  Allergies   Current Outpatient Medications   Medication Sig Dispense Refill     amitriptyline (ELAVIL) 75 MG tablet [AMITRIPTYLINE (ELAVIL) 75 MG TABLET] Take 1 tablet (75 mg total) by mouth at bedtime. 90 tablet 0     amLODIPine (NORVASC) 5 MG tablet Take 5 mg by mouth daily       atenolol (TENORMIN) 25 MG tablet [ATENOLOL (TENORMIN) 25 MG TABLET] Take 25 mg by mouth 2 (two) times a day.  2     cefadroxil (DURICEF) 500 MG capsule Take 1 capsule (500 mg) by mouth 2 times daily 14 capsule 0     hydrOXYzine (VISTARIL) 25 MG capsule Take 1 capsule (25 mg) by mouth 4 times daily 60 capsule 0     ibuprofen (ADVIL/MOTRIN) 800 MG tablet TAKE 1 TABLET BY MOUTH EVERY 8 HOURS WITH FOOD OR MILK AS NEEDED       omeprazole (PRILOSEC) 40 MG capsule [OMEPRAZOLE (PRILOSEC) 40 MG CAPSULE] Take 40 mg by mouth 2 (two) times a day before meals.       polyethylene glycol (MIRALAX) 17 gram packet [POLYETHYLENE GLYCOL (MIRALAX) 17 GRAM PACKET] Take 17 g by mouth daily as needed.       SENNA-docusate sodium (SENNA S) 8.6-50 MG tablet Take 2 tablets by mouth 2 times daily 50 tablet 0     VENTOLIN  (90 Base) MCG/ACT inhaler INHALE 1 PUFF BY MOUTH EVERY 4 HOURS AS NEEDED       acetaminophen (TYLENOL) 325 MG tablet Take 3 tablets (975 mg) by mouth every 8 hours (Patient not taking: Reported on 11/15/2022) 90 tablet 0     aspirin (ASA) 81 MG chewable tablet Take 1 tablet (81 mg) by mouth 2 times daily (Patient not taking: Reported on 11/15/2022) 60 tablet 0     ferrous sulfate 325 (65 FE) MG tablet [FERROUS SULFATE 325 (65 FE) MG TABLET] Take 1 tablet by mouth 2 (two) times a day with meals. (Patient not taking: Reported on 11/15/2022)        methylPREDNISolone (MEDROL DOSEPAK) 4 MG tablet therapy pack Follow Package Directions (Patient not taking: Reported on 11/15/2022) 21 tablet 0     oxyCODONE (ROXICODONE) 5 MG tablet Take 1-2 tablets (5-10 mg) by mouth every 6 hours as needed for pain (Patient not taking: Reported on 11/15/2022) 50 tablet 0      Allergies   Allergen Reactions     Augmentin Nausea and Vomiting     Flagyl [Metronidazole] Nausea and Vomiting     Lisinopril Nausea and Vomiting     Morphine Nausea and Vomiting     No Clinical Screening - See Comments Nausea and Vomiting     Phenergan [Promethazine] Nausea and Vomiting         Lab Results    Chemistry/lipid CBC Cardiac Enzymes/BNP/TSH/INR   Recent Labs   Lab Test 11/08/22  0805 06/14/22  1234 03/25/22  1638   TRIG  --   --  219*   LDL  --   --  137*   BUN 11.8   < > 10   *   < > 141   CO2 23   < > 26    < > = values in this interval not displayed.    Recent Labs   Lab Test 11/08/22  0805   WBC 5.2   HGB 13.6   HCT 40.7   MCV 96       Recent Labs   Lab Test 12/24/19  1647 01/09/19  1620 12/26/18  1600 12/16/18  1429   TROPONINI <0.01   < > <0.01  --    BNP 93  --   --   --    TSH  --   --   --  0.90   INR  --   --  1.01  --     < > = values in this interval not displayed.        A total of 40 minutes was spent reviewing patient's medical records, obtaining history and performing examination, as well as discussing diagnoses/ recommendations with patient and answering all questions.

## 2022-12-07 ENCOUNTER — HOSPITAL ENCOUNTER (OUTPATIENT)
Dept: CARDIOLOGY | Facility: HOSPITAL | Age: 62
Discharge: HOME | End: 2022-12-07
Attending: INTERNAL MEDICINE
Payer: COMMERCIAL

## 2022-12-07 DIAGNOSIS — I49.3 PVC'S (PREMATURE VENTRICULAR CONTRACTIONS): ICD-10-CM

## 2022-12-19 ENCOUNTER — LAB REQUISITION (OUTPATIENT)
Dept: LAB | Facility: CLINIC | Age: 62
End: 2022-12-19

## 2022-12-19 DIAGNOSIS — R10.13 EPIGASTRIC PAIN: ICD-10-CM

## 2022-12-19 LAB
ALBUMIN SERPL BCG-MCNC: 4.1 G/DL (ref 3.5–5.2)
ALP SERPL-CCNC: 244 U/L (ref 35–104)
ALT SERPL W P-5'-P-CCNC: 87 U/L (ref 10–35)
ANION GAP SERPL CALCULATED.3IONS-SCNC: 13 MMOL/L (ref 7–15)
AST SERPL W P-5'-P-CCNC: 60 U/L (ref 10–35)
BILIRUB SERPL-MCNC: 0.4 MG/DL
BUN SERPL-MCNC: 8.9 MG/DL (ref 8–23)
CALCIUM SERPL-MCNC: 11.1 MG/DL (ref 8.8–10.2)
CHLORIDE SERPL-SCNC: 103 MMOL/L (ref 98–107)
CREAT SERPL-MCNC: 0.82 MG/DL (ref 0.51–0.95)
CRP SERPL-MCNC: 7.46 MG/L
DEPRECATED HCO3 PLAS-SCNC: 22 MMOL/L (ref 22–29)
GFR SERPL CREATININE-BSD FRML MDRD: 79 ML/MIN/1.73M2
GLUCOSE SERPL-MCNC: 100 MG/DL (ref 70–99)
LIPASE SERPL-CCNC: 23 U/L (ref 13–60)
POTASSIUM SERPL-SCNC: 4.3 MMOL/L (ref 3.4–5.3)
PROT SERPL-MCNC: 7.5 G/DL (ref 6.4–8.3)
SODIUM SERPL-SCNC: 138 MMOL/L (ref 136–145)

## 2022-12-19 PROCEDURE — 86140 C-REACTIVE PROTEIN: CPT | Performed by: FAMILY MEDICINE

## 2022-12-19 PROCEDURE — 83690 ASSAY OF LIPASE: CPT | Performed by: FAMILY MEDICINE

## 2022-12-19 PROCEDURE — 80053 COMPREHEN METABOLIC PANEL: CPT | Performed by: FAMILY MEDICINE

## 2023-02-03 ENCOUNTER — HOSPITAL ENCOUNTER (OUTPATIENT)
Dept: CT IMAGING | Facility: HOSPITAL | Age: 63
Discharge: HOME | End: 2023-02-03
Attending: OBSTETRICS & GYNECOLOGY | Admitting: OBSTETRICS & GYNECOLOGY
Payer: COMMERCIAL

## 2023-02-03 DIAGNOSIS — Z85.41 HISTORY OF CERVICAL CANCER: ICD-10-CM

## 2023-02-03 PROCEDURE — 250N000011 HC RX IP 250 OP 636: Performed by: OBSTETRICS & GYNECOLOGY

## 2023-02-03 PROCEDURE — 72193 CT PELVIS W/DYE: CPT

## 2023-02-03 RX ORDER — IOPAMIDOL 755 MG/ML
100 INJECTION, SOLUTION INTRAVASCULAR ONCE
Status: COMPLETED | OUTPATIENT
Start: 2023-02-03 | End: 2023-02-03

## 2023-02-03 RX ADMIN — IOPAMIDOL 100 ML: 755 INJECTION, SOLUTION INTRAVENOUS at 10:37

## 2023-07-13 ENCOUNTER — LAB REQUISITION (OUTPATIENT)
Dept: LAB | Facility: CLINIC | Age: 63
End: 2023-07-13

## 2023-07-13 DIAGNOSIS — I10 ESSENTIAL (PRIMARY) HYPERTENSION: ICD-10-CM

## 2023-07-13 LAB
ALBUMIN SERPL BCG-MCNC: 4.5 G/DL (ref 3.5–5.2)
ALP SERPL-CCNC: 219 U/L (ref 35–104)
ALT SERPL W P-5'-P-CCNC: 51 U/L (ref 0–50)
ANION GAP SERPL CALCULATED.3IONS-SCNC: 12 MMOL/L (ref 7–15)
AST SERPL W P-5'-P-CCNC: 32 U/L (ref 0–45)
BILIRUB SERPL-MCNC: 0.4 MG/DL
BUN SERPL-MCNC: 12.6 MG/DL (ref 8–23)
CALCIUM SERPL-MCNC: 10.5 MG/DL (ref 8.8–10.2)
CHLORIDE SERPL-SCNC: 102 MMOL/L (ref 98–107)
CREAT SERPL-MCNC: 0.74 MG/DL (ref 0.51–0.95)
DEPRECATED HCO3 PLAS-SCNC: 25 MMOL/L (ref 22–29)
GFR SERPL CREATININE-BSD FRML MDRD: 89 ML/MIN/1.73M2
GLUCOSE SERPL-MCNC: 131 MG/DL (ref 70–99)
POTASSIUM SERPL-SCNC: 4.2 MMOL/L (ref 3.4–5.3)
PROT SERPL-MCNC: 7.7 G/DL (ref 6.4–8.3)
SODIUM SERPL-SCNC: 139 MMOL/L (ref 136–145)

## 2023-07-13 PROCEDURE — 80053 COMPREHEN METABOLIC PANEL: CPT | Performed by: PHYSICIAN ASSISTANT

## 2023-11-16 ENCOUNTER — NURSE TRIAGE (OUTPATIENT)
Dept: NURSING | Facility: CLINIC | Age: 63
End: 2023-11-16
Payer: MEDICARE

## 2023-11-16 NOTE — TELEPHONE ENCOUNTER
Nurse Triage SBAR    Is this a 2nd Level Triage? YES, LICENSED PRACTITIONER REVIEW IS REQUIRED    Situation: Pt calling with concerns for COVID.    Background: Pt states her symptoms started on Sunday. She tested positive on Monday.    Assessment: Pt is in pain. She has been throwing up all day and can't keep anything down. She is voiding however. She is also c/o a cough, dizziness, SOB with activity, HA, weakness, and a fever although this has not been confirmed.     Protocol Recommended Disposition:   See HCP Within 4 Hours (Or PCP Triage)    Recommendation: Dispo to be seen within 4 hours. Pt does not have a PCP with [facility]. She was directed to the closest ER but states she will go elsewhere.      Reason for Disposition   MILD difficulty breathing (e.g., minimal/no SOB at rest, SOB with walking, pulse <100)    Additional Information   Negative: SEVERE difficulty breathing (e.g., struggling for each breath, speaks in single words)   Negative: Difficult to awaken or acting confused (e.g., disoriented, slurred speech)   Negative: Bluish (or gray) lips or face now   Negative: Shock suspected (e.g., cold/pale/clammy skin, too weak to stand, low BP, rapid pulse)   Negative: Sounds like a life-threatening emergency to the triager   Negative: SEVERE or constant chest pain or pressure  (Exception: Mild central chest pain, present only when coughing.)   Negative: MODERATE difficulty breathing (e.g., speaks in phrases, SOB even at rest, pulse 100-120)   Negative: [1] Headache AND [2] stiff neck (can't touch chin to chest)   Negative: Oxygen level (e.g., pulse oximetry) 90 percent or lower   Negative: Chest pain or pressure  (Exception: MILD central chest pain, present only when coughing.)   Negative: [1] Drinking very little AND [2] dehydration suspected (e.g., no urine > 12 hours, very dry mouth, very lightheaded)   Negative: Patient sounds very sick or weak to the triager    Protocols used: Coronavirus (COVID-19)  Diagnosed or [id number]    Soraya Marie, ALICE  [facility] Nurse Advisor   11/16/2023  1:46 PM

## 2023-11-21 ENCOUNTER — LAB REQUISITION (OUTPATIENT)
Dept: LAB | Facility: CLINIC | Age: 63
End: 2023-11-21

## 2023-11-21 DIAGNOSIS — R30.0 DYSURIA: ICD-10-CM

## 2023-11-21 PROCEDURE — 87086 URINE CULTURE/COLONY COUNT: CPT | Performed by: PHYSICIAN ASSISTANT

## 2023-11-23 LAB
BACTERIA UR CULT: ABNORMAL
BACTERIA UR CULT: ABNORMAL

## 2024-02-09 ENCOUNTER — HOSPITAL ENCOUNTER (EMERGENCY)
Facility: HOSPITAL | Age: 64
Discharge: HOME | End: 2024-02-09
Attending: EMERGENCY MEDICINE | Admitting: EMERGENCY MEDICINE
Payer: MEDICARE

## 2024-02-09 VITALS
DIASTOLIC BLOOD PRESSURE: 77 MMHG | BODY MASS INDEX: 38.07 KG/M2 | OXYGEN SATURATION: 96 % | HEIGHT: 66 IN | HEART RATE: 64 BPM | RESPIRATION RATE: 16 BRPM | WEIGHT: 236.9 LBS | SYSTOLIC BLOOD PRESSURE: 158 MMHG | TEMPERATURE: 97.3 F
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
## 2024-02-09 PROCEDURE — 99281 EMR DPT VST MAYX REQ PHY/QHP: CPT

## 2024-02-09 ASSESSMENT — ACTIVITIES OF DAILY LIVING (ADL): ADLS_ACUITY_SCORE: 38

## 2024-02-09 ASSESSMENT — ENCOUNTER SYMPTOMS
BACK PAIN: 0
NECK PAIN: 0

## 2024-02-09 NOTE — ED NOTES
She has decided top leave due to xray taking an hour to get to her. She feels this has taken too long. She walked out and would not let me try to get her to stay.

## 2024-02-09 NOTE — ED TRIAGE NOTES
Patient presents to ED for evaluation of left knee pain. Per patient fell earlier this month, now having pain walking. Ambulated to triage without difficulty. Denies pain currently.     Triage Assessment (Adult)       Row Name 02/09/24 1133          Triage Assessment    Airway WDL WDL        Respiratory WDL    Respiratory WDL WDL        Skin Circulation/Temperature WDL    Skin Circulation/Temperature WDL WDL        Cardiac WDL    Cardiac WDL WDL        Peripheral/Neurovascular WDL    Peripheral Neurovascular WDL WDL        Cognitive/Neuro/Behavioral WDL    Cognitive/Neuro/Behavioral WDL WDL

## 2024-02-09 NOTE — ED PROVIDER NOTES
EMERGENCY DEPARTMENT ENCOUNTER      NAME: Lara Streeter  AGE: 63 year old female  YOB: 1960  MRN: [id number]  EVALUATION DATE & TIME: 2024 11:34 AM    PCP: Daphney Grewal    ED PROVIDER: Abimael Bear M.D.      Chief Complaint   Patient presents with    Knee Pain         FINAL IMPRESSION:  1.  Acute fall.  2.  Acute left knee contusion.      ED COURSE & MEDICAL DECISION MAKIN:13 PM I met with the patient to gather history and to perform my initial exam. We discussed plans for the ED course, including diagnostic testing and treatment. PPE worn: cloth mask.  Earlier this month patient fell onto a kneeling position.  Patient complaining of anterior left knee pain since that time.  She is ambulatory.  She not seen anybody for this so far.  She denies other complaints or injuries.  1:49 PM.  Informed by nursing that patient left before x-ray and before any discharge instructions could be given.    Pertinent Labs & Imaging studies reviewed. (See chart for details)  63 year old female presents to the Emergency Department for evaluation of left knee pain after a fall.    At the conclusion of the encounter I discussed the results of all of the tests and the disposition. The questions were answered. The patient or family acknowledged understanding and was agreeable with the care plan.              Medical Decision Making  Obtained supplemental history:Supplemental history obtained?: No  Reviewed external records: Both inpatient and outpatient computer records were reviewed.  Care impacted by chronic illness:Cancer/Chemotherapy and Hypertension  Care significantly affected by social determinants of health:Access to Medical Care  Did you consider but not order tests?: Work up considered but not performed and documented in chart, if applicable  Did you interpret images independently?: Independent interpretation of ECG and images noted in documentation, when applicable.  Consultation  discussion with other provider:Did you involve another provider (consultant, MH, pharmacy, etc.)?: No  Anticipate discharge home after evaluation.      MEDICATIONS GIVEN IN THE EMERGENCY:  Medications - No data to display    NEW PRESCRIPTIONS STARTED AT TODAY'S ER VISIT  New Prescriptions    No medications on file          =================================================================    HPI    Patient information was obtained from: Patient    Use of : N/A         Bermudez JONNY Streeter is a 63 year old female with a pertinent history of hypertension, paroxysmal atrial fibrillation, osteoarthritis of right knee, cervical cancer who presents to this ED by walk in for evaluation of knee pain.     The patient reports that she tripped over her shoelaces and fell on her knees on the stairs. This incident occurred on 2/1/24. She fell on both of her knees, but complains of left knee pain. She reports that she has been walking as normal, but complains that it hurts to move her knee. Patient did not hit her neck, head or back and is not on blood thinners.     She does not identify any waxing or waning symptoms otherwise, exacerbating or alleviating features, associated symptoms except as mentioned. She denies any pain related complaints.    REVIEW OF SYSTEMS   Review of Systems   Musculoskeletal:  Negative for back pain and neck pain.        Left knee pain        PAST MEDICAL HISTORY:  Past Medical History:   Diagnosis Date    Anemia     Anxiety     Cervical cancer (H)     Depression     Diverticulitis     Gastroparesis     GERD (gastroesophageal reflux disease)     Hypertension     IBS (irritable bowel syndrome)     Morbid obesity (H)     Paroxysmal atrial fibrillation (H)        PAST SURGICAL HISTORY:  Past Surgical History:   Procedure Laterality Date    APPENDECTOMY      ARTHROPLASTY KNEE Right 7/26/2021    Procedure: ARTHROPLASTY, RIGHT KNEE, TOTAL;  Surgeon: Vamshi Romero MD;  Location: [facility]  OR    CHOLECYSTECTOMY      ESOPHAGOSCOPY, GASTROSCOPY, DUODENOSCOPY (EGD), COMBINED N/A 10/18/2017    Procedure: ESOPHAGOGASTRODUODENOSCOPY (EGD) with biopsy;  Surgeon: Salima Gauthier MD;  Location: [facility];  Service:     SIGMOIDOSCOPY FLEXIBLE N/A 9/18/2018    Procedure: SIGMOIDOSCOPY with cautery using APC;  Surgeon: Ambrosio Tabor MD;  Location: [facility];  Service:            CURRENT MEDICATIONS:    acetaminophen (TYLENOL) 325 MG tablet  amitriptyline (ELAVIL) 75 MG tablet  amLODIPine (NORVASC) 5 MG tablet  aspirin (ASA) 81 MG chewable tablet  atenolol (TENORMIN) 25 MG tablet  cefadroxil (DURICEF) 500 MG capsule  ferrous sulfate 325 (65 FE) MG tablet  hydrOXYzine (VISTARIL) 25 MG capsule  ibuprofen (ADVIL/MOTRIN) 800 MG tablet  methylPREDNISolone (MEDROL DOSEPAK) 4 MG tablet therapy pack  omeprazole (PRILOSEC) 40 MG capsule  oxyCODONE (ROXICODONE) 5 MG tablet  polyethylene glycol (MIRALAX) 17 gram packet  SENNA-docusate sodium (SENNA S) 8.6-50 MG tablet  VENTOLIN  (90 Base) MCG/ACT inhaler        ALLERGIES:  Allergies   Allergen Reactions    Amoxicillin-Pot Clavulanate Nausea and Vomiting    Flagyl [Metronidazole] Nausea and Vomiting    Lisinopril Nausea and Vomiting    Morphine Nausea and Vomiting    No Clinical Screening - See Comments Nausea and Vomiting    Phenergan [Promethazine] Nausea and Vomiting       FAMILY HISTORY:  Family History   Problem Relation Age of Onset    Diabetes Mother     Heart Disease Mother     Diabetes Father     Diabetes Sister     Breast Cancer No family hx of        SOCIAL HISTORY:   Social History     Socioeconomic History    Marital status: Single   Tobacco Use    Smoking status: Never    Smokeless tobacco: Never   Substance and Sexual Activity    Alcohol use: No    Drug use: No    Sexual activity: Yes     Birth control/protection: Post-menopausal   Other Topics Concern    Parent/sibling w/ CABG, MI or angioplasty before 65F 55M? No   Social History Narrative  "   Lives with daughter and grandchildren   No drugs, alcohol, or tobacco.    VITALS:  BP (!) 158/77   Pulse 64   Temp 97.3  F (36.3  C) (Temporal)   Resp 16   Ht 1.676 m (5' 6\")   Wt 107.5 kg (236 lb 14.4 oz)   SpO2 96%   BMI 38.24 kg/m      PHYSICAL EXAM    Vital Signs:  BP (!) 158/77   Pulse 64   Temp 97.3  F (36.3  C) (Temporal)   Resp 16   Ht 1.676 m (5' 6\")   Wt 107.5 kg (236 lb 14.4 oz)   SpO2 96%   BMI 38.24 kg/m    General:  On entering the room she is in no apparent distress.    Neck:  Neck supple with full range of motion and nontender.    Back:  Back and spine are nontender.  No costovertebral angle tenderness.    HEENT:  Oropharynx clear with moist mucous membranes.  HEENT unremarkable.    Pulmonary:  Chest clear to auscultation without rhonchi rales or wheezing.    Cardiovascular:  Cardiac regular rate and rhythm without murmurs rubs or gallops.    Abdomen:  Abdomen soft nontender.  There is no rebound or guarding.    Muskuloskeletal:  She moves all 4 without any difficulty and has normal neurovascular exams.  Extremities without clubbing, cyanosis, or edema.  Legs and calves are nontender.  Full range of motion of the left leg.  Good pulse capillary refill.  Sensation intact soft touch.  Negative Lachemann's test.  Negative anterior posterior drawer signs.  No medial or lateral laxity.  Neuro:  She is alert and oriented ×3 and moves all extremities symmetrically.    Psych:  Normal affect.    Skin:  Unremarkable and warm and dry.       LAB:  All pertinent labs reviewed and interpreted.  Labs Ordered and Resulted from Time of ED Arrival to Time of ED Departure - No data to display    RADIOLOGY:  Reviewed all pertinent imaging. Please see official radiology report.  No orders to display              EKG:          PROCEDURES:         I, Rosalinda Card, am serving as a scribe to document services personally performed by Dr. Bear based on my observation and the provider's statements to me. " I, Abimael Bear MD attest that Rosalinda Card is acting in a scribe capacity, has observed my performance of the services and has documented them in accordance with my direction.    Abimael Bear M.D.  Emergency Medicine  [facility] EMERGENCY DEPARTMENT  [address] [place] [id number]  [phone]  Dept: [phone]       Abimael Bear MD  02/09/24 8779

## 2024-03-27 ENCOUNTER — LAB REQUISITION (OUTPATIENT)
Dept: LAB | Facility: CLINIC | Age: 64
End: 2024-03-27

## 2024-03-27 DIAGNOSIS — I10 ESSENTIAL (PRIMARY) HYPERTENSION: ICD-10-CM

## 2024-03-27 DIAGNOSIS — E21.3 HYPERPARATHYROIDISM, UNSPECIFIED (H): ICD-10-CM

## 2024-03-27 PROCEDURE — 84100 ASSAY OF PHOSPHORUS: CPT | Performed by: FAMILY MEDICINE

## 2024-03-27 PROCEDURE — 82565 ASSAY OF CREATININE: CPT | Performed by: FAMILY MEDICINE

## 2024-03-28 LAB
ALBUMIN SERPL BCG-MCNC: 4.3 G/DL (ref 3.5–5.2)
ALP SERPL-CCNC: 227 U/L (ref 40–150)
ALT SERPL W P-5'-P-CCNC: 58 U/L (ref 0–50)
ANION GAP SERPL CALCULATED.3IONS-SCNC: 13 MMOL/L (ref 7–15)
AST SERPL W P-5'-P-CCNC: 37 U/L (ref 0–45)
BILIRUB SERPL-MCNC: 0.4 MG/DL
BUN SERPL-MCNC: 13.3 MG/DL (ref 8–23)
CALCIUM SERPL-MCNC: 11.1 MG/DL (ref 8.8–10.2)
CALCIUM SERPL-MCNC: 11.2 MG/DL (ref 8.8–10.2)
CHLORIDE SERPL-SCNC: 100 MMOL/L (ref 98–107)
CREAT SERPL-MCNC: 0.78 MG/DL (ref 0.51–0.95)
CREAT SERPL-MCNC: 0.79 MG/DL (ref 0.51–0.95)
DEPRECATED HCO3 PLAS-SCNC: 25 MMOL/L (ref 22–29)
EGFRCR SERPLBLD CKD-EPI 2021: 82 ML/MIN/1.73M2
EGFRCR SERPLBLD CKD-EPI 2021: 83 ML/MIN/1.73M2
GLUCOSE SERPL-MCNC: 117 MG/DL (ref 70–99)
PHOSPHATE SERPL-MCNC: 3.6 MG/DL (ref 2.5–4.5)
POTASSIUM SERPL-SCNC: 4.2 MMOL/L (ref 3.4–5.3)
PROT SERPL-MCNC: 7.9 G/DL (ref 6.4–8.3)
PTH-INTACT SERPL-MCNC: 127 PG/ML (ref 15–65)
SODIUM SERPL-SCNC: 138 MMOL/L (ref 135–145)

## 2024-06-07 ENCOUNTER — TELEPHONE (OUTPATIENT)
Dept: PHARMACY | Facility: OTHER | Age: 64
End: 2024-06-07
Payer: COMMERCIAL

## 2024-06-07 NOTE — TELEPHONE ENCOUNTER
[facility] Recruitment: [facility]     Referral outreach attempt #1 on June 7, 2024      Outcome: left voicemail- Call back number [phone]    Aarti Dowell CPhT  [facility]

## 2024-06-14 ENCOUNTER — APPOINTMENT (OUTPATIENT)
Dept: RADIOLOGY | Facility: HOSPITAL | Age: 64
End: 2024-06-14
Attending: EMERGENCY MEDICINE
Payer: COMMERCIAL

## 2024-06-14 ENCOUNTER — HOSPITAL ENCOUNTER (OUTPATIENT)
Facility: AMBULATORY SURGERY CENTER | Age: 64
End: 2024-06-14
Attending: COLON & RECTAL SURGERY
Payer: COMMERCIAL

## 2024-06-14 ENCOUNTER — HOSPITAL ENCOUNTER (EMERGENCY)
Facility: HOSPITAL | Age: 64
Discharge: HOME | End: 2024-06-14
Attending: EMERGENCY MEDICINE | Admitting: EMERGENCY MEDICINE
Payer: COMMERCIAL

## 2024-06-14 VITALS
SYSTOLIC BLOOD PRESSURE: 143 MMHG | HEIGHT: 66 IN | OXYGEN SATURATION: 93 % | DIASTOLIC BLOOD PRESSURE: 67 MMHG | RESPIRATION RATE: 20 BRPM | HEART RATE: 62 BPM | TEMPERATURE: 98.7 F | BODY MASS INDEX: 38.24 KG/M2

## 2024-06-14 DIAGNOSIS — S40.022A ARM CONTUSION, LEFT, INITIAL ENCOUNTER: ICD-10-CM

## 2024-06-14 DIAGNOSIS — S80.01XA CONTUSION OF RIGHT KNEE, INITIAL ENCOUNTER: ICD-10-CM
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
## 2024-06-14 PROCEDURE — 99284 EMERGENCY DEPT VISIT MOD MDM: CPT

## 2024-06-14 PROCEDURE — 250N000013 HC RX MED GY IP 250 OP 250 PS 637: Performed by: EMERGENCY MEDICINE

## 2024-06-14 PROCEDURE — 73560 X-RAY EXAM OF KNEE 1 OR 2: CPT | Mod: RT

## 2024-06-14 PROCEDURE — 73110 X-RAY EXAM OF WRIST: CPT | Mod: LT

## 2024-06-14 PROCEDURE — 73090 X-RAY EXAM OF FOREARM: CPT | Mod: LT

## 2024-06-14 RX ORDER — ACETAMINOPHEN 325 MG/1
650 TABLET ORAL ONCE
Status: COMPLETED | OUTPATIENT
Start: 2024-06-14 | End: 2024-06-14

## 2024-06-14 RX ADMIN — ACETAMINOPHEN 650 MG: 325 TABLET ORAL at 11:15

## 2024-06-14 ASSESSMENT — ACTIVITIES OF DAILY LIVING (ADL)
ADLS_ACUITY_SCORE: 38
ADLS_ACUITY_SCORE: 38

## 2024-06-14 ASSESSMENT — COLUMBIA-SUICIDE SEVERITY RATING SCALE - C-SSRS
1. IN THE PAST MONTH, HAVE YOU WISHED YOU WERE DEAD OR WISHED YOU COULD GO TO SLEEP AND NOT WAKE UP?: NO
2. HAVE YOU ACTUALLY HAD ANY THOUGHTS OF KILLING YOURSELF IN THE PAST MONTH?: NO
6. HAVE YOU EVER DONE ANYTHING, STARTED TO DO ANYTHING, OR PREPARED TO DO ANYTHING TO END YOUR LIFE?: NO

## 2024-06-14 ASSESSMENT — ENCOUNTER SYMPTOMS
NECK PAIN: 0
BACK PAIN: 0

## 2024-06-14 NOTE — ED PROVIDER NOTES
EMERGENCY DEPARTMENT ENCOUNTER      NAME: Lara Streeter  AGE: 64 year old female  YOB: 1960  MRN: [id number]  EVALUATION DATE & TIME: 6/14/2024 10:58 AM    PCP: Clinic, [facility]    ED PROVIDER: Treasure Wilhelm M.D.      CHIEF COMPLAINT     Chief Complaint   Patient presents with    Arm Injury         FINAL IMPRESSION:     1. Fall at home, initial encounter    2. Arm contusion, left, initial encounter    3. Contusion of right knee, initial encounter          MEDICAL DECISION MAKING:       Pertinent Labs & Imaging studies reviewed. (See chart for details)    64 year old female presents to the Emergency Department for evaluation of fall    History  Supplemental history from daughter  External Record(s) review as documented below    Exam  Nontoxic cooperative and pleasant no evidence of head trauma.  She is wearing compresses and release immobilizer on the right.  Compressive knee immobilizer on the right.  There is superficial abrasion to the knee with full range of motion is appropriate for admission to the forearm.  There is tenderness palpation of the forearm on the dorsum of the wrist.    Differential Diagnosis include but not limited to duration, fracture, dislocation, DVT, among others      Vital Signs: Hypertension  EKG: None  Imaging: I independently interpreted knee x-ray forearm x-ray wrist x-ray no fracture.Formal read by radiologist.  Home meds: Reviewed  ED meds: Tylenol  Fluids: Oral  Labs:  None    Clinical Impression and Decision Making  64-year-old female presents here accidental fall.  Landed on the right knee and the left arm.  No head trauma denies any chest pain shortness of breath or dizziness prior to falling.    Previous  history of right knee arthroplasty declined on the knee.    Previous History of right wrist fracture currently on soft immobilizer.    Well-appearing on exam here with her daughter no evidence of head injury oriented x 3 GCS of 15 no midline  cervical thoracic lumbar tenderness palpation no chest wall or abdominal tenderness palpation.    Superficial abrasion to the right knee with well-healing surgical scar no hemarthrosis.  Superficial abrasion to the forearm with tenderness to palpation of the wrist radial median ulnar nerves are intact.    Patient given Tylenol.  X-ray of the wrist forearm right knee done.  No obvious fracture does have demineralization.    Discussed with patient.  She sees [facility] for her wrist and her knee.  No obvious fracture placed on wrist immobilizer recommended close follow-up.    She states her knee and the right knee has been swollen since her surgery many years ago.  There is no effusion.  There is no calf tenderness palpation.  We discussed ultrasound but at this moment after shared decision making the swelling is localized to the knee minimally since her surgery many years ago and no clinical evidence of DVT.    Patient discharged ambulatory in stable condition with close follow-up and return precautions.        In addition to the work out documented, I considered the following work up trace on right lower extremity.  Swelling is only on the knee since the surgery no calf tenderness palpation.           Medical Decision Making    History:  Supplemental history from: Documented in chart  External Record(s) reviewed: Documented in chart    Work Up:  Chart documentation includes differential considered and any EKGs or imaging independently interpreted by provider, where specified.  In additional to work up documented, I considered the following work up: Documented in chart, if applicable.    External consultation:  Discussion of management with another provider: Documented in chart, if applicable    Complicating factors:  Care impacted by chronic illness: Hypertension and Other: morbid obesity  Care affected by social determinants of health: N/A    Disposition considerations: Discharge. No recommendations on  prescription strength medication(s). See documentation for any additional details.      Review of External Records  Hospital/Clinic: Orthopedic Surgery  Date: 7/27/2021  Right knee arthroplastic, hypertension, morbid obesity    External Consultation        ED COURSE     11:06 AM I met with the patient for initial interview and encounter. We discussed a plan for treatment and diagnostic interventions.   11:57 AM We discussed the plan for discharge and the patient is agreeable. Reviewed supportive cares, symptomatic treatment, outpatient follow up, and reasons to return to the Emergency Department. All questions and concerns were addressed. Patient to be discharged by ED RN.   12:12 PM I reevaluated the patient    At the conclusion of the encounter I discussed the results of all of the tests and the disposition. The questions were answered. The patient and daughter acknowledged understanding and was agreeable with the care plan.         MEDICATIONS GIVEN IN THE EMERGENCY:     Medications   acetaminophen (TYLENOL) tablet 650 mg (650 mg Oral $Given 6/14/24 111)       NEW PRESCRIPTIONS STARTED AT TODAY'S ER VISIT     Discharge Medication List as of 6/14/2024 12:05 PM             =================================================================    HPI     Patient information was obtained from: the patient     Use of : N/A         Lara Streeter is a 64 year old female who presents by private car with daughter for evaluation of fall and arm pain.     The patient reports slipping with her crocs on while carrying bags across a sidewalk, sustaining left forearm pain with a fall. Denies hitting her head and loss of consciousness. Denies trauma to eyes, nose, teeth, neck, abdomen, and hips. Denies bilateral knee pain, bilateral elbow pain, bilateral wrist pin, bilateral shoulder pain, back pain. She took ibuprofen prior to arrival.     The patient reports a medical history of fracturing her left hand 3 months  ago and she has been seeing [facility].     She denies any other complaints at this time.       REVIEW OF SYSTEMS   Review of Systems   Musculoskeletal:  Negative for back pain and neck pain.        Reports left forearm pain. Denies shoulder pain, elbow pain, wrist pain, knee pain, ankle pain, and foot pain.   All other systems reviewed and are negative.       PAST MEDICAL HISTORY:     Past Medical History:   Diagnosis Date    Anemia     Anxiety     Cervical cancer (H)     Depression     Diverticulitis     Gastroparesis     GERD (gastroesophageal reflux disease)     Hypertension     IBS (irritable bowel syndrome)     Morbid obesity (H)     Paroxysmal atrial fibrillation (H)        PAST SURGICAL HISTORY:     Past Surgical History:   Procedure Laterality Date    APPENDECTOMY      ARTHROPLASTY KNEE Right 7/26/2021    Procedure: ARTHROPLASTY, RIGHT KNEE, TOTAL;  Surgeon: Vamshi Romero MD;  Location: [facility] OR    CHOLECYSTECTOMY      ESOPHAGOSCOPY, GASTROSCOPY, DUODENOSCOPY (EGD), COMBINED N/A 10/18/2017    Procedure: ESOPHAGOGASTRODUODENOSCOPY (EGD) with biopsy;  Surgeon: Salima Gauthier MD;  Location: [facility];  Service:     SIGMOIDOSCOPY FLEXIBLE N/A 9/18/2018    Procedure: SIGMOIDOSCOPY with cautery using APC;  Surgeon: Ambrosio Tabor MD;  Location: [facility];  Service:          CURRENT MEDICATIONS:   acetaminophen (TYLENOL) 325 MG tablet  amitriptyline (ELAVIL) 75 MG tablet  amLODIPine (NORVASC) 5 MG tablet  aspirin (ASA) 81 MG chewable tablet  atenolol (TENORMIN) 25 MG tablet  cefadroxil (DURICEF) 500 MG capsule  ferrous sulfate 325 (65 FE) MG tablet  hydrOXYzine (VISTARIL) 25 MG capsule  ibuprofen (ADVIL/MOTRIN) 800 MG tablet  methylPREDNISolone (MEDROL DOSEPAK) 4 MG tablet therapy pack  omeprazole (PRILOSEC) 40 MG capsule  oxyCODONE (ROXICODONE) 5 MG tablet  polyethylene glycol (MIRALAX) 17 gram packet  SENNA-docusate sodium (SENNA S) 8.6-50 MG tablet  VENTOLIN  (90 Base)  "MCG/ACT inhaler         ALLERGIES:     Allergies   Allergen Reactions    Amoxicillin-Pot Clavulanate Nausea and Vomiting    Flagyl [Metronidazole] Nausea and Vomiting    Lisinopril Nausea and Vomiting    Morphine Nausea and Vomiting    No Clinical Screening - See Comments Nausea and Vomiting    Phenergan [Promethazine] Nausea and Vomiting       FAMILY HISTORY:     Family History   Problem Relation Age of Onset    Diabetes Mother     Heart Disease Mother     Diabetes Father     Diabetes Sister     Breast Cancer No family hx of        SOCIAL HISTORY:     Social History     Socioeconomic History    Marital status: Single   Tobacco Use    Smoking status: Never    Smokeless tobacco: Never   Substance and Sexual Activity    Alcohol use: No    Drug use: No    Sexual activity: Yes     Birth control/protection: Post-menopausal   Other Topics Concern    Parent/sibling w/ CABG, MI or angioplasty before 65F 55M? No   Social History Narrative    Lives with daughter and grandchildren       VITALS:   BP (!) 143/67   Pulse 62   Temp 98.7  F (37.1  C)   Resp 20   Ht 1.676 m (5' 6\")   SpO2 93%   BMI 38.24 kg/m      PHYSICAL EXAM     Physical Exam  Vitals and nursing note reviewed. Exam conducted with a chaperone present.   Constitutional:       General: She is not in acute distress.     Appearance: She is not ill-appearing, toxic-appearing or diaphoretic.   Musculoskeletal:        Arms:         Legs:       Comments: Superficial  scar.  Superficial abrasion.  No hemarthrosis.  No calf tenderness palpation  Or facial abrasion full range of motion of the wrist and elbow in all fingers.   Neurological:      Mental Status: She is alert.         Physical Exam   Constitutional: Well-appearing, cooperative and pleasant.     Head: Atraumatic.     Nose: Nose normal.     Mouth/Throat: Oropharynx is clear and moist.     Eyes: EOM are normal. Pupils are equal, round, and reactive to light.     Ears: Bilateral pearly white tympanic " membranes.    Neck: Normal range of motion. Neck supple.     Cardiovascular: Normal rate, regular rhythm and normal heart sounds.  2+ femoral pulses/radial/DP pulses B    Pulmonary/Chest: Normal effort  and breath sounds normal.     Abdominal: soft nontender.    Musculoskeletal: Normal range of motion. Abrasion to left forearm and tenderness with palpation. Abrasion to right knee and surgical scar to right knee.     Neurological: Moves upper and lower extremities equally.    Lymphatics: no edema, no calves pain, no palpable cords.    : NA    Skin: Skin is warm and dry.     Psychiatric: Normal mood and affect. Behavior is normal.       LAB:     All pertinent labs reviewed and interpreted.  Labs Ordered and Resulted from Time of ED Arrival to Time of ED Departure - No data to display     RADIOLOGY:     Reviewed all pertinent imaging. Please see official radiology report.  XR Knee Right 1/2 Views   Final Result   IMPRESSION: Right total knee replacement. Bones are demineralized. Small joint effusion. No evidence of an acute fracture.      XR Wrist Left G/E 3 Views   Final Result   IMPRESSION: Bones are demineralized. There is no evidence of an acute displaced left wrist fracture. Mild arthritic changes thumb CMC joint.       Left forearm is intact without evidence of an acute fracture. No malalignment.      Radius/Ulna XR,  PA &LAT, left   Final Result   IMPRESSION: Bones are demineralized. There is no evidence of an acute displaced left wrist fracture. Mild arthritic changes thumb CMC joint.       Left forearm is intact without evidence of an acute fracture. No malalignment.           EKG:       I have independently reviewed and interpreted the EKG(s) documented above.      PROCEDURES:     Procedures      I, Josue Amezcua, am serving as a scribe to document services personally performed by Dr. Wilhelm based on my observation and the provider's statements to me. I, Treasure Wilhelm MD attest that Josue Amezcua is acting  in a scribe capacity, has observed my performance of the services and has documented them in accordance with my direction.    Treasure Wilhelm M.D.  Emergency Medicine  [facility] EMERGENCY DEPARTMENT  [address] [place] [id number] [phone]  Dept: [phone]       Treasure Wilhelm MD  06/14/24 [id number]

## 2024-06-14 NOTE — DISCHARGE INSTRUCTIONS
Read and follow the discharge instructions.    Your x-rays did not show any obvious broken bones by your bones are a DME neurolyse.  They means that they are brittle.  You should follow-up with your primary care doctor.    Use the immobilizer as instructed as needed for pain    Take Tylenol as needed for pain.    Call your hand doctor on Monday to make a follow-up appointment to evaluate your left hand    Return for worsening symptoms or any other concerns.

## 2024-07-26 ENCOUNTER — HOSPITAL ENCOUNTER (OUTPATIENT)
Facility: AMBULATORY SURGERY CENTER | Age: 64
End: 2024-07-26
Attending: COLON & RECTAL SURGERY
Payer: COMMERCIAL

## 2024-09-13 ENCOUNTER — LAB REQUISITION (OUTPATIENT)
Dept: LAB | Facility: CLINIC | Age: 64
End: 2024-09-13

## 2024-09-13 DIAGNOSIS — R42 DIZZINESS AND GIDDINESS: ICD-10-CM

## 2024-09-13 PROCEDURE — 80053 COMPREHEN METABOLIC PANEL: CPT

## 2024-09-13 PROCEDURE — 82607 VITAMIN B-12: CPT

## 2024-09-13 PROCEDURE — 82306 VITAMIN D 25 HYDROXY: CPT

## 2024-09-14 LAB
ALBUMIN SERPL BCG-MCNC: 4 G/DL (ref 3.5–5.2)
ALP SERPL-CCNC: 185 U/L (ref 40–150)
ALT SERPL W P-5'-P-CCNC: 36 U/L (ref 0–50)
ANION GAP SERPL CALCULATED.3IONS-SCNC: 13 MMOL/L (ref 7–15)
AST SERPL W P-5'-P-CCNC: 25 U/L (ref 0–45)
BILIRUB SERPL-MCNC: 0.3 MG/DL
BUN SERPL-MCNC: 19.2 MG/DL (ref 8–23)
CALCIUM SERPL-MCNC: 10.1 MG/DL (ref 8.8–10.4)
CHLORIDE SERPL-SCNC: 107 MMOL/L (ref 98–107)
CREAT SERPL-MCNC: 1 MG/DL (ref 0.51–0.95)
EGFRCR SERPLBLD CKD-EPI 2021: 62 ML/MIN/1.73M2
GLUCOSE SERPL-MCNC: 101 MG/DL (ref 70–99)
HCO3 SERPL-SCNC: 19 MMOL/L (ref 22–29)
POTASSIUM SERPL-SCNC: 3.8 MMOL/L (ref 3.4–5.3)
PROT SERPL-MCNC: 7.4 G/DL (ref 6.4–8.3)
SODIUM SERPL-SCNC: 139 MMOL/L (ref 135–145)
VIT B12 SERPL-MCNC: 238 PG/ML (ref 232–1245)
VIT D+METAB SERPL-MCNC: 14 NG/ML (ref 20–50)

## 2024-09-17 ENCOUNTER — ANCILLARY PROCEDURE (OUTPATIENT)
Dept: MAMMOGRAPHY | Facility: HOSPITAL | Age: 64
End: 2024-09-17
Attending: FAMILY MEDICINE
Payer: COMMERCIAL

## 2024-09-17 DIAGNOSIS — Z12.31 VISIT FOR SCREENING MAMMOGRAM: ICD-10-CM

## 2024-09-17 PROCEDURE — 77063 BREAST TOMOSYNTHESIS BI: CPT

## 2025-03-03 ENCOUNTER — LAB REQUISITION (OUTPATIENT)
Dept: LAB | Facility: CLINIC | Age: 65
End: 2025-03-03
Payer: COMMERCIAL

## 2025-03-03 DIAGNOSIS — E66.01 MORBID (SEVERE) OBESITY DUE TO EXCESS CALORIES (H): ICD-10-CM

## 2025-03-03 DIAGNOSIS — E21.3 HYPERPARATHYROIDISM, UNSPECIFIED: ICD-10-CM

## 2025-03-03 DIAGNOSIS — I10 ESSENTIAL (PRIMARY) HYPERTENSION: ICD-10-CM

## 2025-03-03 PROCEDURE — 82310 ASSAY OF CALCIUM: CPT | Mod: ORL | Performed by: FAMILY MEDICINE

## 2025-03-03 PROCEDURE — 83970 ASSAY OF PARATHORMONE: CPT | Mod: ORL | Performed by: FAMILY MEDICINE

## 2025-03-03 PROCEDURE — 80053 COMPREHEN METABOLIC PANEL: CPT | Mod: ORL | Performed by: FAMILY MEDICINE

## 2025-03-03 PROCEDURE — 80061 LIPID PANEL: CPT | Mod: ORL | Performed by: FAMILY MEDICINE

## 2025-03-04 LAB
ALBUMIN SERPL BCG-MCNC: 3.8 G/DL (ref 3.5–5.2)
ALP SERPL-CCNC: 214 U/L (ref 40–150)
ALT SERPL W P-5'-P-CCNC: 34 U/L (ref 0–50)
ANION GAP SERPL CALCULATED.3IONS-SCNC: 11 MMOL/L (ref 7–15)
AST SERPL W P-5'-P-CCNC: 25 U/L (ref 0–45)
BILIRUB SERPL-MCNC: 0.3 MG/DL
BUN SERPL-MCNC: 13.8 MG/DL (ref 8–23)
CALCIUM SERPL-MCNC: 10.8 MG/DL (ref 8.8–10.4)
CALCIUM SERPL-MCNC: 10.9 MG/DL (ref 8.8–10.4)
CHLORIDE SERPL-SCNC: 104 MMOL/L (ref 98–107)
CHOLEST SERPL-MCNC: 222 MG/DL
CREAT SERPL-MCNC: 0.75 MG/DL (ref 0.51–0.95)
CREAT SERPL-MCNC: 0.75 MG/DL (ref 0.51–0.95)
EGFRCR SERPLBLD CKD-EPI 2021: 87 ML/MIN/1.73M2
EGFRCR SERPLBLD CKD-EPI 2021: 87 ML/MIN/1.73M2
FASTING STATUS PATIENT QL REPORTED: NO
FASTING STATUS PATIENT QL REPORTED: NO
GLUCOSE SERPL-MCNC: 106 MG/DL (ref 70–99)
HCO3 SERPL-SCNC: 22 MMOL/L (ref 22–29)
HDLC SERPL-MCNC: 47 MG/DL
LDLC SERPL CALC-MCNC: 140 MG/DL
NONHDLC SERPL-MCNC: 175 MG/DL
PHOSPHATE SERPL-MCNC: 2.8 MG/DL (ref 2.5–4.5)
POTASSIUM SERPL-SCNC: 4.1 MMOL/L (ref 3.4–5.3)
PROT SERPL-MCNC: 7.4 G/DL (ref 6.4–8.3)
PTH-INTACT SERPL-MCNC: 150 PG/ML (ref 15–65)
SODIUM SERPL-SCNC: 137 MMOL/L (ref 135–145)
TRIGL SERPL-MCNC: 176 MG/DL

## 2025-03-31 ENCOUNTER — HOSPITAL ENCOUNTER (EMERGENCY)
Facility: HOSPITAL | Age: 65
Discharge: HOME | End: 2025-03-31
Attending: EMERGENCY MEDICINE | Admitting: EMERGENCY MEDICINE
Payer: COMMERCIAL

## 2025-03-31 VITALS
DIASTOLIC BLOOD PRESSURE: 74 MMHG | WEIGHT: 243.4 LBS | RESPIRATION RATE: 18 BRPM | OXYGEN SATURATION: 98 % | HEART RATE: 82 BPM | TEMPERATURE: 99.2 F | SYSTOLIC BLOOD PRESSURE: 167 MMHG | BODY MASS INDEX: 39.12 KG/M2 | HEIGHT: 66 IN

## 2025-03-31 DIAGNOSIS — M94.0 COSTOCHONDRITIS: ICD-10-CM

## 2025-03-31 DIAGNOSIS — R07.89 CHEST WALL PAIN: ICD-10-CM

## 2025-03-31 LAB
ANION GAP SERPL CALCULATED.3IONS-SCNC: 9 MMOL/L (ref 7–15)
BASOPHILS # BLD AUTO: 0 10E3/UL (ref 0–0.2)
BASOPHILS NFR BLD AUTO: 0 %
BUN SERPL-MCNC: 15.8 MG/DL (ref 8–23)
CALCIUM SERPL-MCNC: 10.8 MG/DL (ref 8.8–10.4)
CHLORIDE SERPL-SCNC: 104 MMOL/L (ref 98–107)
CREAT SERPL-MCNC: 0.71 MG/DL (ref 0.51–0.95)
EGFRCR SERPLBLD CKD-EPI 2021: >90 ML/MIN/1.73M2
EOSINOPHIL # BLD AUTO: 0.1 10E3/UL (ref 0–0.7)
EOSINOPHIL NFR BLD AUTO: 1 %
ERYTHROCYTE [DISTWIDTH] IN BLOOD BY AUTOMATED COUNT: 12.7 % (ref 10–15)
GLUCOSE SERPL-MCNC: 148 MG/DL (ref 70–99)
HCO3 SERPL-SCNC: 24 MMOL/L (ref 22–29)
HCT VFR BLD AUTO: 41.8 % (ref 35–47)
HGB BLD-MCNC: 14.3 G/DL (ref 11.7–15.7)
HOLD SPECIMEN: NORMAL
IMM GRANULOCYTES # BLD: 0 10E3/UL
IMM GRANULOCYTES NFR BLD: 0 %
LYMPHOCYTES # BLD AUTO: 1.7 10E3/UL (ref 0.8–5.3)
LYMPHOCYTES NFR BLD AUTO: 29 %
MCH RBC QN AUTO: 30.6 PG (ref 26.5–33)
MCHC RBC AUTO-ENTMCNC: 34.2 G/DL (ref 31.5–36.5)
MCV RBC AUTO: 89 FL (ref 78–100)
MONOCYTES # BLD AUTO: 0.5 10E3/UL (ref 0–1.3)
MONOCYTES NFR BLD AUTO: 8 %
NEUTROPHILS # BLD AUTO: 3.6 10E3/UL (ref 1.6–8.3)
NEUTROPHILS NFR BLD AUTO: 61 %
NRBC # BLD AUTO: 0 10E3/UL
NRBC BLD AUTO-RTO: 0 /100
PLATELET # BLD AUTO: 262 10E3/UL (ref 150–450)
POTASSIUM SERPL-SCNC: 3.9 MMOL/L (ref 3.4–5.3)
RBC # BLD AUTO: 4.68 10E6/UL (ref 3.8–5.2)
SODIUM SERPL-SCNC: 137 MMOL/L (ref 135–145)
TROPONIN T SERPL HS-MCNC: 13 NG/L
WBC # BLD AUTO: 5.9 10E3/UL (ref 4–11)

## 2025-03-31 PROCEDURE — 85004 AUTOMATED DIFF WBC COUNT: CPT | Performed by: STUDENT IN AN ORGANIZED HEALTH CARE EDUCATION/TRAINING PROGRAM

## 2025-03-31 PROCEDURE — 85025 COMPLETE CBC W/AUTO DIFF WBC: CPT | Performed by: EMERGENCY MEDICINE

## 2025-03-31 PROCEDURE — 84484 ASSAY OF TROPONIN QUANT: CPT | Performed by: EMERGENCY MEDICINE

## 2025-03-31 PROCEDURE — 99284 EMERGENCY DEPT VISIT MOD MDM: CPT | Mod: 25 | Performed by: EMERGENCY MEDICINE

## 2025-03-31 PROCEDURE — 250N000013 HC RX MED GY IP 250 OP 250 PS 637: Performed by: STUDENT IN AN ORGANIZED HEALTH CARE EDUCATION/TRAINING PROGRAM

## 2025-03-31 PROCEDURE — 85014 HEMATOCRIT: CPT | Performed by: STUDENT IN AN ORGANIZED HEALTH CARE EDUCATION/TRAINING PROGRAM

## 2025-03-31 PROCEDURE — 80048 BASIC METABOLIC PNL TOTAL CA: CPT | Performed by: EMERGENCY MEDICINE

## 2025-03-31 PROCEDURE — 250N000011 HC RX IP 250 OP 636: Performed by: EMERGENCY MEDICINE

## 2025-03-31 PROCEDURE — 93005 ELECTROCARDIOGRAM TRACING: CPT | Performed by: EMERGENCY MEDICINE

## 2025-03-31 PROCEDURE — 93005 ELECTROCARDIOGRAM TRACING: CPT | Performed by: STUDENT IN AN ORGANIZED HEALTH CARE EDUCATION/TRAINING PROGRAM

## 2025-03-31 PROCEDURE — 84484 ASSAY OF TROPONIN QUANT: CPT | Performed by: STUDENT IN AN ORGANIZED HEALTH CARE EDUCATION/TRAINING PROGRAM

## 2025-03-31 PROCEDURE — 36415 COLL VENOUS BLD VENIPUNCTURE: CPT | Performed by: STUDENT IN AN ORGANIZED HEALTH CARE EDUCATION/TRAINING PROGRAM

## 2025-03-31 PROCEDURE — 80048 BASIC METABOLIC PNL TOTAL CA: CPT | Performed by: STUDENT IN AN ORGANIZED HEALTH CARE EDUCATION/TRAINING PROGRAM

## 2025-03-31 PROCEDURE — 96374 THER/PROPH/DIAG INJ IV PUSH: CPT | Performed by: EMERGENCY MEDICINE

## 2025-03-31 RX ORDER — KETOROLAC TROMETHAMINE 15 MG/ML
15 INJECTION, SOLUTION INTRAMUSCULAR; INTRAVENOUS ONCE
Status: COMPLETED | OUTPATIENT
Start: 2025-03-31 | End: 2025-03-31

## 2025-03-31 RX ORDER — ASPIRIN 81 MG/1
324 TABLET, CHEWABLE ORAL ONCE
Status: COMPLETED | OUTPATIENT
Start: 2025-03-31 | End: 2025-03-31

## 2025-03-31 RX ADMIN — ASPIRIN 81 MG CHEWABLE TABLET 324 MG: 81 TABLET CHEWABLE at 19:59

## 2025-03-31 RX ADMIN — KETOROLAC TROMETHAMINE 15 MG: 15 INJECTION, SOLUTION INTRAMUSCULAR; INTRAVENOUS at 21:09

## 2025-04-01 NOTE — ED TRIAGE NOTES
Comes for evaluation of mid chest pain that started this morning and continues. Denies any nausea or diaphoresis. Stated is slightly dizzy.     Triage Assessment (Adult)       Row Name 03/31/25 0875          Triage Assessment    Airway WDL WDL        Skin Circulation/Temperature WDL    Skin Circulation/Temperature WDL WDL        Cardiac WDL    Cardiac WDL X  chest pain        Peripheral/Neurovascular WDL    Peripheral Neurovascular WDL WDL        Cognitive/Neuro/Behavioral WDL    Cognitive/Neuro/Behavioral WDL WDL

## 2025-04-02 LAB
ATRIAL RATE - MUSE: 78 BPM
DIASTOLIC BLOOD PRESSURE - MUSE: NORMAL MMHG
INTERPRETATION ECG - MUSE: NORMAL
P AXIS - MUSE: -7 DEGREES
PR INTERVAL - MUSE: 128 MS
QRS DURATION - MUSE: 88 MS
QT - MUSE: 368 MS
QTC - MUSE: 419 MS
R AXIS - MUSE: -3 DEGREES
SYSTOLIC BLOOD PRESSURE - MUSE: NORMAL MMHG
T AXIS - MUSE: 11 DEGREES
VENTRICULAR RATE- MUSE: 78 BPM

## 2025-04-08 ENCOUNTER — LAB REQUISITION (OUTPATIENT)
Dept: LAB | Facility: CLINIC | Age: 65
End: 2025-04-08
Payer: COMMERCIAL

## 2025-04-08 DIAGNOSIS — J02.9 ACUTE PHARYNGITIS, UNSPECIFIED: ICD-10-CM

## 2025-04-08 PROCEDURE — 87081 CULTURE SCREEN ONLY: CPT | Mod: ORL | Performed by: FAMILY MEDICINE

## 2025-04-10 LAB — BACTERIA SPEC CULT: NORMAL

## 2025-04-11 LAB — BACTERIA SPEC CULT: ABNORMAL

## (undated) DEVICE — TOURNIQUET SGL  BLADDER 30"X4" BLUE 5921030135

## (undated) DEVICE — SOL NACL 0.9% IRRIG 1000ML BOTTLE 2F7124

## (undated) DEVICE — SUCTION MANIFOLD NEPTUNE 2 SYS 4 PORT 0702-020-000

## (undated) DEVICE — BLADE SAW SAGITTAL STRK DUAL CUT 4118-135-090

## (undated) DEVICE — GOWN LG DISP 9515

## (undated) DEVICE — SU MONOCRYL 3-0 PS-2 18" UND MCP497G

## (undated) DEVICE — SUTURE VICRYL+ 2-0 27IN CT-1 UND VCP259H

## (undated) DEVICE — DRESSING MEPILEX BORDER POST-OP 4X10

## (undated) DEVICE — DRSG ABD TNDRSRB WET PRUF 8IN X 10IN STRL  9194A

## (undated) DEVICE — SOL NACL 0.9% INJ 1000ML BAG 2B1324X

## (undated) DEVICE — CUSTOM PACK TOTAL KNEE SOP5BTKHEC

## (undated) DEVICE — ADH SKIN CLOSURE PREMIERPRO EXOFIN 1.0ML 3470

## (undated) DEVICE — GLOVE UNDER INDICATOR PI SZ 7.0 LF 41670

## (undated) DEVICE — DRSG GAUZE 4X4" TRAY 6939

## (undated) DEVICE — CUSTOM PACK TOTAL KNEE ACCESSORY SOP5BTAHEA

## (undated) DEVICE — SU STRATAFIX PDS PLUS 1 CT-1 18" SXPP1A404

## (undated) DEVICE — CAST PADDING 6" STERILE 9046S

## (undated) DEVICE — GLOVE SURG PI ULTRA TOUCH M SZ 7-1/2 LF

## (undated) DEVICE — BANDAGE ELASTIC 6X550 LF DBL 593-96LF

## (undated) DEVICE — SOL WATER IRRIG 1000ML BOTTLE 2F7114

## (undated) DEVICE — GLOVE BIOGEL PI SZ 7.0 40870

## (undated) DEVICE — HOLDER LIMB VELCRO OR 0814-1533

## (undated) DEVICE — PLATE GROUNDING ADULT W/CORD 9165L

## (undated) DEVICE — GLOVE BIOGEL INDICATOR 7.5 LF 41675

## (undated) DEVICE — SUTURE VICRYL+ 1 27IN CT-1 UND VCP261H

## (undated) DEVICE — HOOD FLYTE SURGICOOL

## (undated) DEVICE — DECANTER VIAL 2006S

## (undated) DEVICE — GLOVE UNDER INDICATOR PI SZ 6.5 LF 41665